# Patient Record
Sex: MALE | Race: WHITE | Employment: OTHER | URBAN - METROPOLITAN AREA
[De-identification: names, ages, dates, MRNs, and addresses within clinical notes are randomized per-mention and may not be internally consistent; named-entity substitution may affect disease eponyms.]

---

## 2017-01-03 ENCOUNTER — HOSPITAL ENCOUNTER (OUTPATIENT)
Dept: PHYSICAL THERAPY | Age: 77
Discharge: HOME | End: 2017-01-03

## 2017-01-03 PROBLEM — R29.3 POSTURAL IMBALANCE: Status: ACTIVE | Noted: 2017-01-03

## 2017-01-03 PROBLEM — R41.3 MEMORY DEFICITS: Status: ACTIVE | Noted: 2017-01-03

## 2017-01-03 PROBLEM — G56.31 MONONEUROPATHY OF RIGHT POSTERIOR INTEROSSEOUS NERVE: Status: ACTIVE | Noted: 2017-01-03

## 2017-01-03 PROBLEM — R25.1 TREMOR: Status: ACTIVE | Noted: 2017-01-03

## 2017-01-03 PROBLEM — G20 PARKINSON DISEASE (HCC): Status: ACTIVE | Noted: 2017-01-03

## 2017-01-03 PROBLEM — G47.52 RBD (REM BEHAVIORAL DISORDER): Status: ACTIVE | Noted: 2017-01-03

## 2017-01-04 NOTE — PROGRESS NOTES
Viviana Lovett  : 1940 [facility]  [address] [facility], [address] [place] [address],[address], [place] [address].  [phone]   [phone]       Patient not seen in clinic today due to wanting to particpate in therapy at a clinic that is closer to his home so deferred evaluation today and the MD will refer to another clinic.

## 2017-01-17 ENCOUNTER — APPOINTMENT (OUTPATIENT)
Dept: CT IMAGING | Age: 77
DRG: 056 | End: 2017-01-17
Attending: EMERGENCY MEDICINE
Payer: MEDICARE

## 2017-01-17 ENCOUNTER — HOSPITAL ENCOUNTER (INPATIENT)
Age: 77
LOS: 10 days | Discharge: NURSING, REHAB OR LONG-TERM CARE | DRG: 056 | End: 2017-01-27
Attending: EMERGENCY MEDICINE | Admitting: HOSPITALIST
Payer: MEDICARE

## 2017-01-17 ENCOUNTER — APPOINTMENT (OUTPATIENT)
Dept: GENERAL RADIOLOGY | Age: 77
DRG: 056 | End: 2017-01-17
Attending: EMERGENCY MEDICINE
Payer: MEDICARE

## 2017-01-17 DIAGNOSIS — N28.9 KIDNEY INSUFFICIENCY: ICD-10-CM

## 2017-01-17 DIAGNOSIS — G93.40 ACUTE ENCEPHALOPATHY: Primary | ICD-10-CM

## 2017-01-17 DIAGNOSIS — R44.3 HALLUCINATION: ICD-10-CM

## 2017-01-17 PROBLEM — N17.9 ACUTE RENAL FAILURE (ARF) (HCC): Status: ACTIVE | Noted: 2017-01-17

## 2017-01-17 LAB
ALBUMIN SERPL BCP-MCNC: 3.9 G/DL (ref 3.2–4.6)
ALBUMIN/GLOB SERPL: 1.2 {RATIO} (ref 1.2–3.5)
ALP SERPL-CCNC: 146 U/L (ref 50–136)
ALT SERPL-CCNC: 9 U/L (ref 12–65)
ANION GAP BLD CALC-SCNC: 9 MMOL/L (ref 7–16)
APPEARANCE UR: CLEAR
AST SERPL W P-5'-P-CCNC: 20 U/L (ref 15–37)
ATRIAL RATE: 62 BPM
BACTERIA URNS QL MICRO: 0 /HPF
BASOPHILS # BLD AUTO: 0 K/UL (ref 0–0.2)
BASOPHILS # BLD: 0 % (ref 0–2)
BILIRUB SERPL-MCNC: 0.5 MG/DL (ref 0.2–1.1)
BILIRUB UR QL: NEGATIVE
BUN SERPL-MCNC: 38 MG/DL (ref 8–23)
CALCIUM SERPL-MCNC: 8.9 MG/DL (ref 8.3–10.4)
CALCULATED P AXIS, ECG09: 49 DEGREES
CALCULATED R AXIS, ECG10: -26 DEGREES
CALCULATED T AXIS, ECG11: -16 DEGREES
CASTS URNS QL MICRO: ABNORMAL /LPF
CHLORIDE SERPL-SCNC: 105 MMOL/L (ref 98–107)
CK SERPL-CCNC: 300 U/L (ref 21–215)
CO2 SERPL-SCNC: 29 MMOL/L (ref 21–32)
COLOR UR: YELLOW
CREAT SERPL-MCNC: 2.62 MG/DL (ref 0.8–1.5)
DIAGNOSIS, 93000: NORMAL
DIASTOLIC BP, ECG02: NORMAL MMHG
DIFFERENTIAL METHOD BLD: ABNORMAL
EOSINOPHIL # BLD: 0.1 K/UL (ref 0–0.8)
EOSINOPHIL NFR BLD: 1 % (ref 0.5–7.8)
EPI CELLS #/AREA URNS HPF: 0 /HPF
ERYTHROCYTE [DISTWIDTH] IN BLOOD BY AUTOMATED COUNT: 14.5 % (ref 11.9–14.6)
FOLATE SERPL-MCNC: 12.1 NG/ML (ref 3.1–17.5)
GLOBULIN SER CALC-MCNC: 3.2 G/DL (ref 2.3–3.5)
GLUCOSE SERPL-MCNC: 173 MG/DL (ref 65–100)
GLUCOSE UR STRIP.AUTO-MCNC: NEGATIVE MG/DL
HCT VFR BLD AUTO: 36.4 % (ref 41.1–50.3)
HGB BLD-MCNC: 12.4 G/DL (ref 13.6–17.2)
HGB UR QL STRIP: ABNORMAL
IMM GRANULOCYTES # BLD: 0 K/UL (ref 0–0.5)
IMM GRANULOCYTES NFR BLD AUTO: 0.4 % (ref 0–5)
KETONES UR QL STRIP.AUTO: NEGATIVE MG/DL
LEUKOCYTE ESTERASE UR QL STRIP.AUTO: NEGATIVE
LYMPHOCYTES # BLD AUTO: 12 % (ref 13–44)
LYMPHOCYTES # BLD: 1.4 K/UL (ref 0.5–4.6)
MCH RBC QN AUTO: 27.7 PG (ref 26.1–32.9)
MCHC RBC AUTO-ENTMCNC: 34.1 G/DL (ref 31.4–35)
MCV RBC AUTO: 81.4 FL (ref 79.6–97.8)
MONOCYTES # BLD: 0.9 K/UL (ref 0.1–1.3)
MONOCYTES NFR BLD AUTO: 8 % (ref 4–12)
NEUTS SEG # BLD: 8.8 K/UL (ref 1.7–8.2)
NEUTS SEG NFR BLD AUTO: 79 % (ref 43–78)
NITRITE UR QL STRIP.AUTO: NEGATIVE
P-R INTERVAL, ECG05: 208 MS
PH UR STRIP: 5 [PH] (ref 5–9)
PLATELET # BLD AUTO: 225 K/UL (ref 150–450)
PMV BLD AUTO: 9.8 FL (ref 10.8–14.1)
POTASSIUM SERPL-SCNC: 3.5 MMOL/L (ref 3.5–5.1)
PROT SERPL-MCNC: 7.1 G/DL (ref 6.3–8.2)
PROT UR STRIP-MCNC: ABNORMAL MG/DL
Q-T INTERVAL, ECG07: 430 MS
QRS DURATION, ECG06: 92 MS
QTC CALCULATION (BEZET), ECG08: 436 MS
RBC # BLD AUTO: 4.47 M/UL (ref 4.23–5.67)
RBC #/AREA URNS HPF: ABNORMAL /HPF
SODIUM SERPL-SCNC: 143 MMOL/L (ref 136–145)
SP GR UR REFRACTOMETRY: 1.01 (ref 1–1.02)
SYSTOLIC BP, ECG01: NORMAL MMHG
TROPONIN I SERPL-MCNC: 0.11 NG/ML (ref 0.02–0.05)
TSH SERPL DL<=0.005 MIU/L-ACNC: 0.53 UIU/ML (ref 0.36–3.74)
UROBILINOGEN UR QL STRIP.AUTO: 0.2 EU/DL (ref 0.2–1)
VENTRICULAR RATE, ECG03: 62 BPM
VIT B12 SERPL-MCNC: 445 PG/ML (ref 193–986)
WBC # BLD AUTO: 11.3 K/UL (ref 4.3–11.1)
WBC URNS QL MICRO: ABNORMAL /HPF

## 2017-01-17 PROCEDURE — 72100 X-RAY EXAM L-S SPINE 2/3 VWS: CPT

## 2017-01-17 PROCEDURE — 81001 URINALYSIS AUTO W/SCOPE: CPT | Performed by: HOSPITALIST

## 2017-01-17 PROCEDURE — 85025 COMPLETE CBC W/AUTO DIFF WBC: CPT | Performed by: EMERGENCY MEDICINE

## 2017-01-17 PROCEDURE — 86592 SYPHILIS TEST NON-TREP QUAL: CPT | Performed by: HOSPITALIST

## 2017-01-17 PROCEDURE — 72220 X-RAY EXAM SACRUM TAILBONE: CPT

## 2017-01-17 PROCEDURE — 74011250636 HC RX REV CODE- 250/636: Performed by: HOSPITALIST

## 2017-01-17 PROCEDURE — 80053 COMPREHEN METABOLIC PANEL: CPT | Performed by: EMERGENCY MEDICINE

## 2017-01-17 PROCEDURE — 82550 ASSAY OF CK (CPK): CPT | Performed by: HOSPITALIST

## 2017-01-17 PROCEDURE — 84443 ASSAY THYROID STIM HORMONE: CPT | Performed by: HOSPITALIST

## 2017-01-17 PROCEDURE — 74011250637 HC RX REV CODE- 250/637: Performed by: HOSPITALIST

## 2017-01-17 PROCEDURE — 99285 EMERGENCY DEPT VISIT HI MDM: CPT | Performed by: EMERGENCY MEDICINE

## 2017-01-17 PROCEDURE — 72125 CT NECK SPINE W/O DYE: CPT

## 2017-01-17 PROCEDURE — 82746 ASSAY OF FOLIC ACID SERUM: CPT | Performed by: HOSPITALIST

## 2017-01-17 PROCEDURE — 84484 ASSAY OF TROPONIN QUANT: CPT | Performed by: HOSPITALIST

## 2017-01-17 PROCEDURE — 74011250636 HC RX REV CODE- 250/636: Performed by: EMERGENCY MEDICINE

## 2017-01-17 PROCEDURE — 65270000029 HC RM PRIVATE

## 2017-01-17 PROCEDURE — 74011250636 HC RX REV CODE- 250/636: Performed by: INTERNAL MEDICINE

## 2017-01-17 PROCEDURE — 82607 VITAMIN B-12: CPT | Performed by: HOSPITALIST

## 2017-01-17 PROCEDURE — 70450 CT HEAD/BRAIN W/O DYE: CPT

## 2017-01-17 PROCEDURE — 71010 XR CHEST PORT: CPT

## 2017-01-17 PROCEDURE — 93005 ELECTROCARDIOGRAM TRACING: CPT | Performed by: EMERGENCY MEDICINE

## 2017-01-17 PROCEDURE — 81003 URINALYSIS AUTO W/O SCOPE: CPT | Performed by: EMERGENCY MEDICINE

## 2017-01-17 PROCEDURE — 96360 HYDRATION IV INFUSION INIT: CPT | Performed by: EMERGENCY MEDICINE

## 2017-01-17 RX ORDER — SODIUM CHLORIDE 0.9 % (FLUSH) 0.9 %
5-10 SYRINGE (ML) INJECTION EVERY 8 HOURS
Status: DISCONTINUED | OUTPATIENT
Start: 2017-01-17 | End: 2017-01-27 | Stop reason: HOSPADM

## 2017-01-17 RX ORDER — ASPIRIN 81 MG/1
81 TABLET ORAL DAILY
Status: DISCONTINUED | OUTPATIENT
Start: 2017-01-18 | End: 2017-01-27 | Stop reason: HOSPADM

## 2017-01-17 RX ORDER — MELATONIN 5 MG
5 CAPSULE ORAL
COMMUNITY
End: 2017-01-27

## 2017-01-17 RX ORDER — HEPARIN SODIUM 5000 [USP'U]/ML
5000 INJECTION, SOLUTION INTRAVENOUS; SUBCUTANEOUS EVERY 8 HOURS
Status: DISCONTINUED | OUTPATIENT
Start: 2017-01-17 | End: 2017-01-27 | Stop reason: HOSPADM

## 2017-01-17 RX ORDER — AMLODIPINE BESYLATE 10 MG/1
10 TABLET ORAL DAILY
Status: DISCONTINUED | OUTPATIENT
Start: 2017-01-18 | End: 2017-01-17

## 2017-01-17 RX ORDER — HYDRALAZINE HYDROCHLORIDE 20 MG/ML
20 INJECTION INTRAMUSCULAR; INTRAVENOUS
Status: DISCONTINUED | OUTPATIENT
Start: 2017-01-17 | End: 2017-01-27 | Stop reason: HOSPADM

## 2017-01-17 RX ORDER — FINASTERIDE 5 MG/1
5 TABLET, FILM COATED ORAL DAILY
Status: DISCONTINUED | OUTPATIENT
Start: 2017-01-18 | End: 2017-01-27 | Stop reason: HOSPADM

## 2017-01-17 RX ORDER — ESCITALOPRAM OXALATE 10 MG/1
10 TABLET ORAL DAILY
Status: DISCONTINUED | OUTPATIENT
Start: 2017-01-18 | End: 2017-01-27 | Stop reason: HOSPADM

## 2017-01-17 RX ORDER — SODIUM CHLORIDE 0.9 % (FLUSH) 0.9 %
5-10 SYRINGE (ML) INJECTION AS NEEDED
Status: DISCONTINUED | OUTPATIENT
Start: 2017-01-17 | End: 2017-01-27 | Stop reason: HOSPADM

## 2017-01-17 RX ORDER — HALOPERIDOL 5 MG/ML
2 INJECTION INTRAMUSCULAR
Status: DISCONTINUED | OUTPATIENT
Start: 2017-01-17 | End: 2017-01-19

## 2017-01-17 RX ORDER — SODIUM CHLORIDE 9 MG/ML
125 INJECTION, SOLUTION INTRAVENOUS CONTINUOUS
Status: DISCONTINUED | OUTPATIENT
Start: 2017-01-17 | End: 2017-01-19

## 2017-01-17 RX ORDER — CIPROFLOXACIN 250 MG/1
250 TABLET, FILM COATED ORAL EVERY 12 HOURS
COMMUNITY
End: 2017-01-27

## 2017-01-17 RX ORDER — AMOXICILLIN AND CLAVULANATE POTASSIUM 875; 125 MG/1; MG/1
1 TABLET, FILM COATED ORAL 2 TIMES DAILY
COMMUNITY
End: 2017-01-27

## 2017-01-17 RX ORDER — FACIAL-BODY WIPES
10 EACH TOPICAL DAILY PRN
Status: DISCONTINUED | OUTPATIENT
Start: 2017-01-17 | End: 2017-01-27 | Stop reason: HOSPADM

## 2017-01-17 RX ORDER — CARBIDOPA AND LEVODOPA 25; 100 MG/1; MG/1
2 TABLET ORAL
Status: DISCONTINUED | OUTPATIENT
Start: 2017-01-17 | End: 2017-01-19

## 2017-01-17 RX ORDER — DIPHENOXYLATE HYDROCHLORIDE AND ATROPINE SULFATE 2.5; .025 MG/1; MG/1
2 TABLET ORAL
Status: DISCONTINUED | OUTPATIENT
Start: 2017-01-17 | End: 2017-01-27 | Stop reason: HOSPADM

## 2017-01-17 RX ORDER — MELATONIN
1000 DAILY
Status: DISCONTINUED | OUTPATIENT
Start: 2017-01-18 | End: 2017-01-27 | Stop reason: HOSPADM

## 2017-01-17 RX ORDER — CARBIDOPA AND LEVODOPA 25; 100 MG/1; MG/1
2 TABLET ORAL 3 TIMES DAILY
Status: DISCONTINUED | OUTPATIENT
Start: 2017-01-17 | End: 2017-01-17

## 2017-01-17 RX ORDER — TEMAZEPAM 15 MG/1
30 CAPSULE ORAL
Status: DISCONTINUED | OUTPATIENT
Start: 2017-01-17 | End: 2017-01-21

## 2017-01-17 RX ORDER — DIPHENHYDRAMINE HYDROCHLORIDE 50 MG/ML
12.5 INJECTION, SOLUTION INTRAMUSCULAR; INTRAVENOUS
Status: DISCONTINUED | OUTPATIENT
Start: 2017-01-17 | End: 2017-01-26

## 2017-01-17 RX ORDER — TAMSULOSIN HYDROCHLORIDE 0.4 MG/1
0.4 CAPSULE ORAL DAILY
Status: DISCONTINUED | OUTPATIENT
Start: 2017-01-18 | End: 2017-01-27 | Stop reason: HOSPADM

## 2017-01-17 RX ORDER — AMLODIPINE BESYLATE 10 MG/1
10 TABLET ORAL DAILY
Status: DISCONTINUED | OUTPATIENT
Start: 2017-01-17 | End: 2017-01-27 | Stop reason: HOSPADM

## 2017-01-17 RX ORDER — ACETAMINOPHEN 325 MG/1
650 TABLET ORAL
Status: DISCONTINUED | OUTPATIENT
Start: 2017-01-17 | End: 2017-01-27 | Stop reason: HOSPADM

## 2017-01-17 RX ORDER — NITROGLYCERIN 0.4 MG/1
0.4 TABLET SUBLINGUAL
Status: DISCONTINUED | OUTPATIENT
Start: 2017-01-17 | End: 2017-01-27 | Stop reason: HOSPADM

## 2017-01-17 RX ORDER — NALOXONE HYDROCHLORIDE 0.4 MG/ML
0.4 INJECTION, SOLUTION INTRAMUSCULAR; INTRAVENOUS; SUBCUTANEOUS AS NEEDED
Status: DISCONTINUED | OUTPATIENT
Start: 2017-01-17 | End: 2017-01-27 | Stop reason: HOSPADM

## 2017-01-17 RX ORDER — CLOPIDOGREL BISULFATE 75 MG/1
75 TABLET ORAL DAILY
Status: DISCONTINUED | OUTPATIENT
Start: 2017-01-18 | End: 2017-01-27 | Stop reason: HOSPADM

## 2017-01-17 RX ORDER — ONDANSETRON 4 MG/1
4 TABLET, ORALLY DISINTEGRATING ORAL
Status: DISCONTINUED | OUTPATIENT
Start: 2017-01-17 | End: 2017-01-27 | Stop reason: HOSPADM

## 2017-01-17 RX ORDER — ATORVASTATIN CALCIUM 40 MG/1
40 TABLET, FILM COATED ORAL DAILY
Status: DISCONTINUED | OUTPATIENT
Start: 2017-01-18 | End: 2017-01-27 | Stop reason: HOSPADM

## 2017-01-17 RX ADMIN — SODIUM CHLORIDE 1000 ML: 900 INJECTION, SOLUTION INTRAVENOUS at 11:52

## 2017-01-17 RX ADMIN — HEPARIN SODIUM 5000 UNITS: 5000 INJECTION, SOLUTION INTRAVENOUS; SUBCUTANEOUS at 17:45

## 2017-01-17 RX ADMIN — Medication 10 ML: at 23:30

## 2017-01-17 RX ADMIN — SODIUM CHLORIDE 125 ML/HR: 900 INJECTION, SOLUTION INTRAVENOUS at 17:45

## 2017-01-17 RX ADMIN — AMLODIPINE BESYLATE 10 MG: 10 TABLET ORAL at 17:45

## 2017-01-17 RX ADMIN — HALOPERIDOL LACTATE 2 MG: 5 INJECTION, SOLUTION INTRAMUSCULAR at 20:15

## 2017-01-17 RX ADMIN — TEMAZEPAM 30 MG: 15 CAPSULE ORAL at 23:17

## 2017-01-17 RX ADMIN — CARBIDOPA AND LEVODOPA 2 TABLET: 25; 100 TABLET ORAL at 17:45
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
## 2017-01-17 NOTE — PROGRESS NOTES
TRANSFER - IN REPORT:    Verbal report received from Jesse RN on Nayely Delgado  being received from [phone] for routine progression of care      Report consisted of patients Situation, Background, Assessment and   Recommendations(SBAR). Information from the following report(s) SBAR, ED Summary, [facility] and Recent Results was reviewed with the receiving nurse. Assessment completed upon patients arrival to unit and care assumed.

## 2017-01-17 NOTE — PROGRESS NOTES
Admission database complete. Prior to admission med list completed from list daughter brought in with patient. List placed on chart. New patient packet given and reviewed with patient/family. Patient/family oriented to room and call system. SBAR handoff given to primary nurse.

## 2017-01-17 NOTE — ED PROVIDER NOTES
HPI Comments: Patient is a 66 yo male who is coming in with more confusion over the last 2 weeks. Patient has had problems with this before and his pcp took him off of his clonazepam to see if this would help, but it has not. Patient also has a history of parkinsons disease. Patient has had a lot of hallucinations and has fallen last time was Sunday before last.   Last night he was found walking outside after midnight barefoot. He was confused at this time. He does stay at home by himself. He is currently on cipro and augmentin for possible urinary infection. He has been complaining of pain in his tailbone and they think he could have fallen last night. Patient is a 67 y.o. male presenting with altered mental status. The history is provided by the patient and a relative. Altered mental status           History reviewed. No pertinent past medical history. Past Surgical History:   Procedure Laterality Date    Hx heent       eye removed; glass eye    Hx orthopaedic       knee replacement    Hx orthopaedic Left      rotator cuff    Pr cardiac surg procedure unlist       stent placement    Hx thyroidectomy           History reviewed. No pertinent family history. Social History     Social History    Marital status:      Spouse name: N/A    Number of children: N/A    Years of education: N/A     Occupational History    Not on file.      Social History Main Topics    Smoking status: Former Smoker    Smokeless tobacco: Not on file    Alcohol use No    Drug use: No    Sexual activity: Not on file     Other Topics Concern    Not on file     Social History Narrative         ALLERGIES: Tramadol; Hydrocodone-acetaminophen; and Sulfa (sulfonamide antibiotics)    Review of Systems   Unable to perform ROS: Mental status change       Vitals:    01/17/17 0949   BP: 95/52   Pulse: 65   Resp: 16   Temp: 98.1 °F (36.7 °C)   SpO2: 95%   Weight: 97.5 kg (215 lb)   Height: 6' 3.5\" (1.918 m) Physical Exam   Constitutional: He appears well-developed and well-nourished. No distress. Cardiovascular: Normal rate and regular rhythm. Murmur heard. Pulmonary/Chest: No respiratory distress. He has no wheezes. He has no rales. Abdominal: Soft. Bowel sounds are normal. He exhibits no distension. There is no tenderness. There is no rebound and no guarding. Neurological: He is alert. He displays normal reflexes. No cranial nerve deficit. He exhibits normal muscle tone. Coordination normal.   Oriented to self. Not oriented place or time. Does not know age he is confused currently. Skin: Skin is warm and dry. He is not diaphoretic. No erythema. Abrasions present on his shins and hands. Psychiatric: He has a normal mood and affect. His behavior is normal.        MDM  Number of Diagnoses or Management Options  Diagnosis management comments: Patient has 2 weeks of delirium or hallucinations and altered mental status workup in ED has been relatively normal except for elevated creatinine of 2.6 baseline seems to be about 1.5-1.6 by starting last years creatinine levels. I am giving IV fluids and admitting to hospitalist for further evaluation. Zane Espinoza MD; 1/17/2017 @1:53 PM Voice dictation software was used during the making of this note. This software is not perfect and grammatical and other typographical errors may be present.   This note has not been proofread for errors.  ===================================================================        Amount and/or Complexity of Data Reviewed  Clinical lab tests: ordered and reviewed (Results for orders placed or performed during the hospital encounter of 01/17/17  -CBC WITH AUTOMATED DIFF       Result                                            Value                         Ref Range                       WBC                                               11.3 (H)                      4.3 - 11.1 K/uL                 RBC 4.47                          4.23 - 5.67 M/uL                HGB                                               12.4 (L)                      13.6 - 17.2 g/dL                HCT                                               36.4 (L)                      41.1 - 50.3 %                   MCV                                               81.4                          79.6 - 97.8 FL                  MCH                                               27.7                          26.1 - 32.9 PG                  MCHC                                              34.1                          31.4 - 35.0 g/dL                RDW                                               14.5                          11.9 - 14.6 %                   PLATELET                                          225                           150 - 450 K/uL                  MPV                                               9.8 (L)                       10.8 - 14.1 FL                  DF                                                AUTOMATED                                                     NEUTROPHILS                                       79 (H)                        43 - 78 %                       LYMPHOCYTES                                       12 (L)                        13 - 44 %                       MONOCYTES                                         8                             4.0 - 12.0 %                    EOSINOPHILS                                       1                             0.5 - 7.8 %                     BASOPHILS                                         0                             0.0 - 2.0 %                     IMMATURE GRANULOCYTES                             0.4                           0.0 - 5.0 %                     ABS. NEUTROPHILS                                  8.8 (H)                       1.7 - 8.2 K/UL                  ABS.  LYMPHOCYTES                                  1.4 0.5 - 4.6 K/UL                  ABS. MONOCYTES                                    0.9                           0.1 - 1.3 K/UL                  ABS. EOSINOPHILS                                  0.1                           0.0 - 0.8 K/UL                  ABS. BASOPHILS                                    0.0                           0.0 - 0.2 K/UL                  ABS. IMM.  GRANS.                                  0.0                           0.0 - 0.5 K/UL             -METABOLIC PANEL, COMPREHENSIVE       Result                                            Value                         Ref Range                       Sodium                                            143                           136 - 145 mmol/L                Potassium                                         3.5                           3.5 - 5.1 mmol/L                Chloride                                          105                           98 - 107 mmol/L                 CO2                                               29                            21 - 32 mmol/L                  Anion gap                                         9                             7 - 16 mmol/L                   Glucose                                           173 (H)                       65 - 100 mg/dL                  BUN                                               38 (H)                        8 - 23 MG/DL                    Creatinine                                        2.62 (H)                      0.8 - 1.5 MG/DL                 GFR est AA                                        31 (L)                        >60 ml/min/1.73m2               GFR est non-AA                                    25 (L)                        >60 ml/min/1.73m2               Calcium                                           8.9                           8.3 - 10.4 MG/DL                Bilirubin, total                                  0.5 0.2 - 1.1 MG/DL                 ALT                                               9 (L)                         12 - 65 U/L                     AST                                               20                            15 - 37 U/L                     Alk. phosphatase                                  146 (H)                       50 - 136 U/L                    Protein, total                                    7.1                           6.3 - 8.2 g/dL                  Albumin                                           3.9                           3.2 - 4.6 g/dL                  Globulin                                          3.2                           2.3 - 3.5 g/dL                  A-G Ratio                                         1.2                           1.2 - 3.5                 )  Tests in the radiology section of CPT®: ordered and reviewed (Xr Spine Lumb 2 Or 3 V    Result Date: 1/17/2017  LUMBAR SPINE AND SACRUM/COCCYX RADIOGRAPHS, 1/17/2017 CLINICAL HISTORY:  Increased confusion. Cuts demonstrates all over. Tailbone pain. FINDINGS: Lumbar spine: Five lumbar type vertebrae are visualized. Mild alignment abnormalities are seen with grade 1 retrolisthesis of L1 on L2, and L2 on L3. No definite acute posterior element abnormality is seen at these levels. Vertebral body height is maintained at all levels. Moderate diffuse discogenic degenerative changes are seen throughout the lumbar spine. Mild apex rightward upper, and apex low for lower curvatures are seen of the lumbar spine. Sacrum: AP and lateral views of the sacrum and coccyx demonstrate no fracture or other abnormality. The sacroiliac joints are nonwidened. No definite acute abnormality is seen of the partially visualized bony pelvis. The pubic symphysis is maintained. IMPRESSION: 1. No acute osseous abnormality of the lumbar spine or sacrum/coccyx evident by plain film imaging.      Xr Sacrum And Coccyx    Result Date: 1/17/2017  LUMBAR SPINE AND SACRUM/COCCYX RADIOGRAPHS, 1/17/2017 CLINICAL HISTORY:  Increased confusion. Cuts demonstrates all over. Tailbone pain. FINDINGS: Lumbar spine: Five lumbar type vertebrae are visualized. Mild alignment abnormalities are seen with grade 1 retrolisthesis of L1 on L2, and L2 on L3. No definite acute posterior element abnormality is seen at these levels. Vertebral body height is maintained at all levels. Moderate diffuse discogenic degenerative changes are seen throughout the lumbar spine. Mild apex rightward upper, and apex low for lower curvatures are seen of the lumbar spine. Sacrum: AP and lateral views of the sacrum and coccyx demonstrate no fracture or other abnormality. The sacroiliac joints are nonwidened. No definite acute abnormality is seen of the partially visualized bony pelvis. The pubic symphysis is maintained. IMPRESSION: 1. No acute osseous abnormality of the lumbar spine or sacrum/coccyx evident by plain film imaging. Ct Head Wo Cont    Result Date: 1/17/2017  CT HEAD, AND CERVICAL SPINE WITHOUT CONTRAST, 1/17/2017. History: Cuts and scrapes all over. Comparison: None. Technique:   5 mm axial scans from the skull base to the vertex, and 1.25 mm axial scans from the skull base into the upper chest performed, and sagittal and coronal reconstructed images performed. All CT scans performed at this facility use one or all of the following: Automated exposure control, adjustment of the mA and/or kVp according to patient's size, iterative reconstruction. CT HEAD: Findings:  No evidence of intracranial hemorrhage is seen. No abnormal extra-axial fluid collections are seen. The ventricles are normal in size and configuration. Moderate cortical involutional changes are seen which are not felt to be abnormal given the patient's age. No evidence of midline shift or obvious mass effect is seen.   No abnormal edema pattern is seen in a vascular distribution to suggest large artery infarction. Evaluation with bone windows shows no acute osseous abnormality of the bony calvarium. Postsurgical changes are seen of the right globe. No abnormal fluid collections are seen associated with the aerated sinuses. CT CERVICAL SPINE: The skull base is unremarkable although not well evaluated due to bone reconstruction algorithm. Vertebral body height is maintained at all levels. No evidence of acute fracture is seen. Reconstructed images show maintained alignment. The dens is intact, and the pre dens space is normal. Moderate discogenic degenerative changes are seen throughout the mid and lower cervical spine. Advanced facet hypertrophic changes are seen diffusely throughout the bilateral cervical spine. Prevertebral soft tissues are normal. Limited evaluation of the lung apices shows no gross abnormalities. IMPRESSION: 1. No acute intracranial process evident by noncontrast CT study of the head. 2. No acute osseous abnormality the bony calvarium, skull base, or cervical spine. Ct Spine Cerv Wo Cont    Result Date: 1/17/2017  CT HEAD, AND CERVICAL SPINE WITHOUT CONTRAST, 1/17/2017. History: Cuts and scrapes all over. Comparison: None. Technique:   5 mm axial scans from the skull base to the vertex, and 1.25 mm axial scans from the skull base into the upper chest performed, and sagittal and coronal reconstructed images performed. All CT scans performed at this facility use one or all of the following: Automated exposure control, adjustment of the mA and/or kVp according to patient's size, iterative reconstruction. CT HEAD: Findings:  No evidence of intracranial hemorrhage is seen. No abnormal extra-axial fluid collections are seen. The ventricles are normal in size and configuration. Moderate cortical involutional changes are seen which are not felt to be abnormal given the patient's age. No evidence of midline shift or obvious mass effect is seen.   No abnormal edema pattern is seen in a vascular distribution to suggest large artery infarction. Evaluation with bone windows shows no acute osseous abnormality of the bony calvarium. Postsurgical changes are seen of the right globe. No abnormal fluid collections are seen associated with the aerated sinuses. CT CERVICAL SPINE: The skull base is unremarkable although not well evaluated due to bone reconstruction algorithm. Vertebral body height is maintained at all levels. No evidence of acute fracture is seen. Reconstructed images show maintained alignment. The dens is intact, and the pre dens space is normal. Moderate discogenic degenerative changes are seen throughout the mid and lower cervical spine. Advanced facet hypertrophic changes are seen diffusely throughout the bilateral cervical spine. Prevertebral soft tissues are normal. Limited evaluation of the lung apices shows no gross abnormalities. IMPRESSION: 1. No acute intracranial process evident by noncontrast CT study of the head. 2. No acute osseous abnormality the bony calvarium, skull base, or cervical spine. Xr Chest Port    Result Date: 1/17/2017  CHEST X-RAY, single portable view  1/17/2017 History: Increased confusion Technique: Single frontal view of the chest. Comparison: None. Findings: The cardiac silhouette is mild to moderately enlarged. The lungs are expanded without evidence for pneumothorax. No consolidative airspace process or pleural effusion is seen. IMPRESSION: 1.   Mild to moderate cardiomegaly.    )    Risk of Complications, Morbidity, and/or Mortality  Presenting problems: high  Diagnostic procedures: high  Management options: high      ED Course       Procedures

## 2017-01-17 NOTE — PROGRESS NOTES
Mr. Latrell Klein family concerned that he has not taken his daily Lisinopril and is not on an antibiotic for his PTA urinary tract infection. Also concerned that his left hand laceration may need stitches and that his tongue is black from biting it when he fell at home. Critical troponin level of 0.11 called at 1625. Spoke with Dr. Barbie Lopez regarding all of the above. States to obtain urine specimen; place steristrips to hand. No other new orders received. Will monitor closely with family at bedside.

## 2017-01-17 NOTE — ED TRIAGE NOTES
Patient's family states patient has been increasingly confused over the past few weeks. [facility] patient was up last night and now has cuts and scrapes all over him and is complaining of pain in his tail bone. [facility] PCP took him off of klonopin to see if it would help confusion as this is and ongoing problem.

## 2017-01-17 NOTE — ED NOTES
TRANSFER - OUT REPORT:    Verbal report given to Sri BROWN(name) on Gilford Eans  being transferred to [facility](unit) for routine progression of care       Report consisted of patients Situation, Background, Assessment and   Recommendations(SBAR). Information from the following report(s) SBAR was reviewed with the receiving nurse. Lines:   Peripheral IV 01/17/17 Left Antecubital (Active)   Site Assessment Clean, dry, & intact 1/17/2017  2:24 PM   Phlebitis Assessment 0 1/17/2017  2:24 PM   Infiltration Assessment 0 1/17/2017  2:24 PM        Opportunity for questions and clarification was provided.       Patient transported with:   [place]

## 2017-01-17 NOTE — PROGRESS NOTES
MR. Mer Cole resting in bed with family at bedside. Alert, agitated at this time. Oriented to person and place only. States he does not want to be bothered. Refusing complete skin assessment at this time. Partial skin assessment completed with Sandi Moody RN. Multiple skin abrasions and tears to arms and legs. Right elbow with skin tear; bandage in place. Left palm with laceration and bandage in place. Right foot with bandage in place from ED. Will address when wound care nurse assesses. Wound consulted. Sacrum is reddened but blanchable and without skin breakdown. On room air with clear lung sounds bilaterally. Suzie alarm placed for safety. Family updated on today's treatment plan and given security code to call for patient status. Snacks and drink provided. Will continue to monitor closely.

## 2017-01-18 ENCOUNTER — APPOINTMENT (OUTPATIENT)
Dept: MRI IMAGING | Age: 77
DRG: 056 | End: 2017-01-18
Attending: HOSPITALIST
Payer: MEDICARE

## 2017-01-18 LAB
ANION GAP BLD CALC-SCNC: 12 MMOL/L (ref 7–16)
BUN SERPL-MCNC: 28 MG/DL (ref 8–23)
CALCIUM SERPL-MCNC: 8.8 MG/DL (ref 8.3–10.4)
CHLORIDE SERPL-SCNC: 108 MMOL/L (ref 98–107)
CO2 SERPL-SCNC: 25 MMOL/L (ref 21–32)
CREAT SERPL-MCNC: 2.02 MG/DL (ref 0.8–1.5)
ERYTHROCYTE [DISTWIDTH] IN BLOOD BY AUTOMATED COUNT: 14.5 % (ref 11.9–14.6)
GLUCOSE SERPL-MCNC: 152 MG/DL (ref 65–100)
HCT VFR BLD AUTO: 34.5 % (ref 41.1–50.3)
HGB BLD-MCNC: 11.8 G/DL (ref 13.6–17.2)
MCH RBC QN AUTO: 27.8 PG (ref 26.1–32.9)
MCHC RBC AUTO-ENTMCNC: 34.2 G/DL (ref 31.4–35)
MCV RBC AUTO: 81.2 FL (ref 79.6–97.8)
PLATELET # BLD AUTO: 215 K/UL (ref 150–450)
PMV BLD AUTO: 10 FL (ref 10.8–14.1)
POTASSIUM SERPL-SCNC: 3.2 MMOL/L (ref 3.5–5.1)
RBC # BLD AUTO: 4.25 M/UL (ref 4.23–5.67)
RPR SER QL: NONREACTIVE
SODIUM SERPL-SCNC: 145 MMOL/L (ref 136–145)
TROPONIN I SERPL-MCNC: 0.12 NG/ML (ref 0.02–0.05)
WBC # BLD AUTO: 10.5 K/UL (ref 4.3–11.1)

## 2017-01-18 PROCEDURE — 80048 BASIC METABOLIC PNL TOTAL CA: CPT | Performed by: HOSPITALIST

## 2017-01-18 PROCEDURE — 70551 MRI BRAIN STEM W/O DYE: CPT

## 2017-01-18 PROCEDURE — 74011250636 HC RX REV CODE- 250/636: Performed by: INTERNAL MEDICINE

## 2017-01-18 PROCEDURE — 74011000302 HC RX REV CODE- 302: Performed by: HOSPITALIST

## 2017-01-18 PROCEDURE — 74011250637 HC RX REV CODE- 250/637: Performed by: HOSPITALIST

## 2017-01-18 PROCEDURE — 85027 COMPLETE CBC AUTOMATED: CPT | Performed by: HOSPITALIST

## 2017-01-18 PROCEDURE — 36415 COLL VENOUS BLD VENIPUNCTURE: CPT | Performed by: HOSPITALIST

## 2017-01-18 PROCEDURE — 74011250636 HC RX REV CODE- 250/636: Performed by: HOSPITALIST

## 2017-01-18 PROCEDURE — 77030011256 HC DRSG MEPILEX <16IN NO BORD MOLN -A

## 2017-01-18 PROCEDURE — 86580 TB INTRADERMAL TEST: CPT | Performed by: HOSPITALIST

## 2017-01-18 PROCEDURE — 65270000029 HC RM PRIVATE

## 2017-01-18 PROCEDURE — 84484 ASSAY OF TROPONIN QUANT: CPT | Performed by: HOSPITALIST

## 2017-01-18 RX ORDER — LABETALOL 200 MG/1
200 TABLET, FILM COATED ORAL EVERY 12 HOURS
Status: DISCONTINUED | OUTPATIENT
Start: 2017-01-18 | End: 2017-01-27 | Stop reason: HOSPADM

## 2017-01-18 RX ORDER — AMOXICILLIN AND CLAVULANATE POTASSIUM 875; 125 MG/1; MG/1
1 TABLET, FILM COATED ORAL EVERY 12 HOURS
Status: DISCONTINUED | OUTPATIENT
Start: 2017-01-18 | End: 2017-01-27 | Stop reason: HOSPADM

## 2017-01-18 RX ADMIN — VITAMIN D, TAB 1000IU (100/BT) 1000 UNITS: 25 TAB at 10:03

## 2017-01-18 RX ADMIN — Medication 5 ML: at 14:00

## 2017-01-18 RX ADMIN — FINASTERIDE 5 MG: 5 TABLET, FILM COATED ORAL at 10:04

## 2017-01-18 RX ADMIN — CARBIDOPA AND LEVODOPA 2 TABLET: 25; 100 TABLET ORAL at 17:55

## 2017-01-18 RX ADMIN — DIPHENHYDRAMINE HYDROCHLORIDE 12.5 MG: 50 INJECTION, SOLUTION INTRAMUSCULAR; INTRAVENOUS at 01:31

## 2017-01-18 RX ADMIN — LABETALOL HCL 200 MG: 200 TABLET, FILM COATED ORAL at 17:55

## 2017-01-18 RX ADMIN — AMOXICILLIN AND CLAVULANATE POTASSIUM 1 TABLET: 875; 125 TABLET, FILM COATED ORAL at 22:27

## 2017-01-18 RX ADMIN — ESCITALOPRAM OXALATE 10 MG: 10 TABLET ORAL at 10:04

## 2017-01-18 RX ADMIN — HEPARIN SODIUM 5000 UNITS: 5000 INJECTION, SOLUTION INTRAVENOUS; SUBCUTANEOUS at 11:40

## 2017-01-18 RX ADMIN — AMLODIPINE BESYLATE 10 MG: 10 TABLET ORAL at 10:05

## 2017-01-18 RX ADMIN — TUBERCULIN PURIFIED PROTEIN DERIVATIVE 5 UNITS: 5 INJECTION INTRADERMAL at 17:56

## 2017-01-18 RX ADMIN — Medication 10 ML: at 06:07

## 2017-01-18 RX ADMIN — TAMSULOSIN HYDROCHLORIDE 0.4 MG: 0.4 CAPSULE ORAL at 10:04

## 2017-01-18 RX ADMIN — HALOPERIDOL LACTATE 2 MG: 5 INJECTION, SOLUTION INTRAMUSCULAR at 04:42

## 2017-01-18 RX ADMIN — CLOPIDOGREL BISULFATE 75 MG: 75 TABLET ORAL at 10:03

## 2017-01-18 RX ADMIN — HEPARIN SODIUM 5000 UNITS: 5000 INJECTION, SOLUTION INTRAVENOUS; SUBCUTANEOUS at 17:55

## 2017-01-18 RX ADMIN — HEPARIN SODIUM 5000 UNITS: 5000 INJECTION, SOLUTION INTRAVENOUS; SUBCUTANEOUS at 00:36

## 2017-01-18 RX ADMIN — SODIUM CHLORIDE 125 ML/HR: 900 INJECTION, SOLUTION INTRAVENOUS at 11:48

## 2017-01-18 RX ADMIN — CARBIDOPA AND LEVODOPA 2 TABLET: 25; 100 TABLET ORAL at 11:55

## 2017-01-18 RX ADMIN — ASPIRIN 81 MG: 81 TABLET, COATED ORAL at 10:01

## 2017-01-18 RX ADMIN — LEVOTHYROXINE SODIUM 137 MCG: 112 TABLET ORAL at 04:48

## 2017-01-18 RX ADMIN — CARBIDOPA AND LEVODOPA 2 TABLET: 25; 100 TABLET ORAL at 04:49

## 2017-01-18 RX ADMIN — ATORVASTATIN CALCIUM 40 MG: 40 TABLET, FILM COATED ORAL at 10:02

## 2017-01-18 NOTE — PROGRESS NOTES
Bedside report given to Eloy Lyon RN. Upon arrival to room family is standing over patient bed crying. Daughter states he is getting very agitated and more confused. States \"I think you are going to have to give him something to settle him down. He is getting worse and trying to get out of bed. \" Remains on room air with IVFs infusing in left arm. Posey alarm is in place for safety. Unable to obtain informed consent for MRI this shift. Tiny Richards, verbalized understanding.

## 2017-01-18 NOTE — PROGRESS NOTES
Hospitalist Progress Note    2017  Admit Date: 2017 10:51 AM   NAME: Prince Padilla   :  1940   MRN:  [id number]   Attending: Shailesh Muhammad MD  PCP:  Kanwal Freire MD      Admitted for:confusion, acute renal failure,     SUBJECTIVE:   Patient this morning still confused about daughter. No fever chills overnight. Was up all night. Has no complaints of pain nausea no vomiting. Having regular bowel movements          Review of Systems negative with exception of pertinent positives noted above  Past medical history unchanged from H&P    PHYSICAL EXAM     Visit Vitals    BP (!) 160/95    Pulse 73    Temp 97.2 °F (36.2 °C)    Resp 18    Ht 6' 3.5\" (1.918 m)    Wt 96.2 kg (212 lb)    SpO2 96%    BMI 26.15 kg/m2      Temp (24hrs), Av.3 °F (36.8 °C), Min:97.2 °F (36.2 °C), Max:99.5 °F (37.5 °C)    Oxygen Therapy  O2 Sat (%): 96 % (17 0821)  Pulse via Oximetry: 87 beats per minute (17 0022)  O2 Device: Room air (17 0022)    Intake/Output Summary (Last 24 hours) at 17 1527  Last data filed at 17 0300   Gross per 24 hour   Intake              240 ml   Output              900 ml   Net             -660 ml        General: No acute distress  mucous membranes pink and moist acyanotic anicteric  Neck:  Supple full range of motion  Lungs:  Air entry equal bilaterally, no crepitations rales rhonchi   Heart:  Regular rate and rhythm,  No murmur, rub, or gallop  Abdomen: Soft, Non distended, Non tender, no rebound guarding Positive bowel sounds  Extremities: No cyanosis, clubbing or edema  Neurologic:  Confused, no focal deficits. No shortness of breath  Musculoskeletal: no   Joint swelling tenderness erythema  Skin:  No erythema, rashes noted          LAB  No results for input(s): GLUCPOC in the last 72 hours.     No lab exists for component: [address]      17   0546   WBC  10.5   HGB  11.8*   HCT  34.5*   PLT  215     Recent Labs      17   05 01/18/17   0035  01/17/17   0950   NA  145   --   143   K  3.2*   --   3.5   CL  108*   --   105   CO2  25   --   29   GLU  152*   --   173*   BUN  28*   --   38*   CREA  2.02*   --   2.62*   CA  8.8   --   8.9   TROIQ   --   0.12*  0.11*   ALB   --    --   3.9   TBILI   --    --   0.5   ALT   --    --   9*   SGOT   --    --   20       EKG and imaging reviewed personally by me  No results found. Results for orders placed or performed during the hospital encounter of 01/17/17   EKG, 12 LEAD, INITIAL   Result Value Ref Range    Systolic BP  mmHg    Diastolic BP  mmHg    Ventricular Rate 62 BPM    Atrial Rate 62 BPM    P-R Interval 208 ms    QRS Duration 92 ms    Q-T Interval 430 ms    QTC Calculation (Bezet) 436 ms    Calculated P Axis 49 degrees    Calculated R Axis -26 degrees    Calculated T Axis -16 degrees    Diagnosis       !! AGE AND GENDER SPECIFIC ECG ANALYSIS !! Normal sinus rhythm  Moderate voltage criteria for LVH, may be normal variant  Borderline ECG  Confirmed by [facility] NOEMI POLLACK (), NADEEN MCCLAIN ([id number]) on 1/17/2017 1:53:13 PM       XR Results (most recent):    Results from [place] encounter on 01/17/17   XR SACRUM AND COCCYX   Narrative LUMBAR SPINE AND SACRUM/COCCYX RADIOGRAPHS, 1/17/2017    CLINICAL HISTORY:  Increased confusion. Cuts demonstrates all over. Tailbone  pain. FINDINGS:    Lumbar spine:   Five lumbar type vertebrae are visualized. Mild alignment abnormalities are  seen with grade 1 retrolisthesis of L1 on L2, and L2 on L3. No definite acute  posterior element abnormality is seen at these levels. Vertebral body height is  maintained at all levels. Moderate diffuse discogenic degenerative changes are  seen throughout the lumbar spine. Mild apex rightward upper, and apex low for  lower curvatures are seen of the lumbar spine. Sacrum:   AP and lateral views of the sacrum and coccyx demonstrate no fracture or other  abnormality. The sacroiliac joints are nonwidened.  No definite acute abnormality  is seen of the partially visualized bony pelvis. The pubic symphysis is  maintained. Impression IMPRESSION:   1. No acute osseous abnormality of the lumbar spine or sacrum/coccyx evident by  plain film imaging. 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 Repeat UA is clear with no evidence of infection.  This is likely treated  · Uncontrolled HTN - his ACE is being held due to ARF will continue to follow    DVT Prophylaxis: heparin  Dispo - home vs STR    Signed By: Brett Romano MD     January 18, 2017

## 2017-01-18 NOTE — PROGRESS NOTES
Spoke with Dr. Clary Ford about pt being confused and trying to get out of bed. Family voices concerns for pt. Safety as well. Clary Ford to review pt chart.

## 2017-01-18 NOTE — PROGRESS NOTES
Met with patient's family regarding discharge planning. Patient has been living at his own home alone, but has had increased confusion and falls resulting in family moving him into their home so that they could care for him. Family requested [facility] paperwork be filled out for in-home assistance when they are not able to be home with the patient. Paperwork completed and signed by attending MD. Licha Arabi that most of the issues that we are currently treating are acute. Therefore, the [address] may require further evaluation at one of their facilities. They voiced understanding. Explained that we have requested PT and OT examinations for recommendations for discharge planning. They voiced approval of this plan as well. Case Management will continue to follow and make discharge recommendations as appropriate.     Care Management Interventions  Transition of Care Consult (CM Consult): Discharge Planning  Discharge Durable Medical Equipment: No  Physical Therapy Consult: Yes  Occupational Therapy Consult: Yes  Speech Therapy Consult: No  Current Support Network: Relative's Home  Confirm Follow Up Transport: Family  Plan discussed with Pt/Family/Caregiver: Yes  Freedom of Choice Offered: Yes  Discharge Location  Discharge Placement: Other: (To be determined.)

## 2017-01-18 NOTE — PROGRESS NOTES
Patient was confused and restless during majority of night. Only resting for very short periods. Patient currently resting quietly with family member at bedside. Respirations even and unlabored.

## 2017-01-18 NOTE — PROGRESS NOTES
Shift assessment completed. Patient alert and oriented to self, but disoriented to time, place and situation. Patient appears anxious, family in room trying to keep pt. Calm. Bruises and abrasions to all extremities. NS infusing at 125 cc/hr. Respirations even and unlabored. No acute distress noted. Bed low, locked, call bell within reach. Side rails up x 3. Posey alarm on.

## 2017-01-18 NOTE — PROGRESS NOTES
Tried to place patient on Cpap, patient is confused and refused to wear.  Talked to patient nurse and asked her to try to convince patient to wear
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
## 2017-01-18 NOTE — PROGRESS NOTES
Spoke with Dr. Argelia Pearce regarding pt. Continual agitation and attempts to get out of bed ewth posey alarm use and 2 mg of haldol IM. Bilateral soft wrist restraints ordered.

## 2017-01-19 LAB
ALBUMIN SERPL BCP-MCNC: 3.4 G/DL (ref 3.2–4.6)
ALBUMIN/GLOB SERPL: 1.2 {RATIO} (ref 1.2–3.5)
ALP SERPL-CCNC: 118 U/L (ref 50–136)
ALT SERPL-CCNC: 13 U/L (ref 12–65)
ANION GAP BLD CALC-SCNC: 7 MMOL/L (ref 7–16)
AST SERPL W P-5'-P-CCNC: 21 U/L (ref 15–37)
BILIRUB DIRECT SERPL-MCNC: 0.1 MG/DL
BILIRUB SERPL-MCNC: 0.7 MG/DL (ref 0.2–1.1)
BUN SERPL-MCNC: 22 MG/DL (ref 8–23)
CALCIUM SERPL-MCNC: 8.6 MG/DL (ref 8.3–10.4)
CHLORIDE SERPL-SCNC: 110 MMOL/L (ref 98–107)
CO2 SERPL-SCNC: 28 MMOL/L (ref 21–32)
CREAT SERPL-MCNC: 1.79 MG/DL (ref 0.8–1.5)
ERYTHROCYTE [DISTWIDTH] IN BLOOD BY AUTOMATED COUNT: 14.4 % (ref 11.9–14.6)
GLOBULIN SER CALC-MCNC: 2.8 G/DL (ref 2.3–3.5)
GLUCOSE SERPL-MCNC: 137 MG/DL (ref 65–100)
HCT VFR BLD AUTO: 33.3 % (ref 41.1–50.3)
HGB BLD-MCNC: 11.3 G/DL (ref 13.6–17.2)
MCH RBC QN AUTO: 27.5 PG (ref 26.1–32.9)
MCHC RBC AUTO-ENTMCNC: 33.9 G/DL (ref 31.4–35)
MCV RBC AUTO: 81 FL (ref 79.6–97.8)
PLATELET # BLD AUTO: 207 K/UL (ref 150–450)
PMV BLD AUTO: 9.9 FL (ref 10.8–14.1)
POTASSIUM SERPL-SCNC: 3.1 MMOL/L (ref 3.5–5.1)
PROT SERPL-MCNC: 6.2 G/DL (ref 6.3–8.2)
RBC # BLD AUTO: 4.11 M/UL (ref 4.23–5.67)
SODIUM SERPL-SCNC: 145 MMOL/L (ref 136–145)
WBC # BLD AUTO: 9.9 K/UL (ref 4.3–11.1)

## 2017-01-19 PROCEDURE — 80048 BASIC METABOLIC PNL TOTAL CA: CPT | Performed by: HOSPITALIST

## 2017-01-19 PROCEDURE — 74011250637 HC RX REV CODE- 250/637: Performed by: HOSPITALIST

## 2017-01-19 PROCEDURE — 85027 COMPLETE CBC AUTOMATED: CPT | Performed by: HOSPITALIST

## 2017-01-19 PROCEDURE — 74011000250 HC RX REV CODE- 250: Performed by: HOSPITALIST

## 2017-01-19 PROCEDURE — 36415 COLL VENOUS BLD VENIPUNCTURE: CPT | Performed by: HOSPITALIST

## 2017-01-19 PROCEDURE — C8929 TTE W OR WO FOL WCON,DOPPLER: HCPCS

## 2017-01-19 PROCEDURE — 74011250636 HC RX REV CODE- 250/636: Performed by: INTERNAL MEDICINE

## 2017-01-19 PROCEDURE — 97162 PT EVAL MOD COMPLEX 30 MIN: CPT

## 2017-01-19 PROCEDURE — 97166 OT EVAL MOD COMPLEX 45 MIN: CPT

## 2017-01-19 PROCEDURE — 65270000029 HC RM PRIVATE

## 2017-01-19 PROCEDURE — 74011250636 HC RX REV CODE- 250/636: Performed by: HOSPITALIST

## 2017-01-19 PROCEDURE — 80076 HEPATIC FUNCTION PANEL: CPT | Performed by: HOSPITALIST

## 2017-01-19 RX ADMIN — VITAMIN D, TAB 1000IU (100/BT) 1000 UNITS: 25 TAB at 10:15

## 2017-01-19 RX ADMIN — HEPARIN SODIUM 5000 UNITS: 5000 INJECTION, SOLUTION INTRAVENOUS; SUBCUTANEOUS at 18:01

## 2017-01-19 RX ADMIN — PERFLUTREN 1 ML: 6.52 INJECTION, SUSPENSION INTRAVENOUS at 14:00

## 2017-01-19 RX ADMIN — AMLODIPINE BESYLATE 10 MG: 10 TABLET ORAL at 10:15

## 2017-01-19 RX ADMIN — CARBIDOPA AND LEVODOPA 2 TABLET: 25; 100 TABLET ORAL at 12:29

## 2017-01-19 RX ADMIN — ASPIRIN 81 MG: 81 TABLET, COATED ORAL at 10:15

## 2017-01-19 RX ADMIN — Medication 5 ML: at 23:51

## 2017-01-19 RX ADMIN — LABETALOL HCL 200 MG: 200 TABLET, FILM COATED ORAL at 23:50

## 2017-01-19 RX ADMIN — TEMAZEPAM 30 MG: 15 CAPSULE ORAL at 04:26

## 2017-01-19 RX ADMIN — LEVOTHYROXINE SODIUM 137 MCG: 112 TABLET ORAL at 06:11

## 2017-01-19 RX ADMIN — CARBIDOPA AND LEVODOPA 2 TABLET: 25; 100 TABLET ORAL at 06:11

## 2017-01-19 RX ADMIN — Medication 10 ML: at 18:01

## 2017-01-19 RX ADMIN — AMOXICILLIN AND CLAVULANATE POTASSIUM 1 TABLET: 875; 125 TABLET, FILM COATED ORAL at 23:50

## 2017-01-19 RX ADMIN — LABETALOL HCL 200 MG: 200 TABLET, FILM COATED ORAL at 10:15

## 2017-01-19 RX ADMIN — AMOXICILLIN AND CLAVULANATE POTASSIUM 1 TABLET: 875; 125 TABLET, FILM COATED ORAL at 10:15

## 2017-01-19 RX ADMIN — CLOPIDOGREL BISULFATE 75 MG: 75 TABLET ORAL at 10:15

## 2017-01-19 RX ADMIN — HEPARIN SODIUM 5000 UNITS: 5000 INJECTION, SOLUTION INTRAVENOUS; SUBCUTANEOUS at 10:16

## 2017-01-19 RX ADMIN — HALOPERIDOL LACTATE 2 MG: 5 INJECTION, SOLUTION INTRAMUSCULAR at 12:37

## 2017-01-19 RX ADMIN — ESCITALOPRAM OXALATE 10 MG: 10 TABLET ORAL at 10:15

## 2017-01-19 RX ADMIN — HEPARIN SODIUM 5000 UNITS: 5000 INJECTION, SOLUTION INTRAVENOUS; SUBCUTANEOUS at 01:00

## 2017-01-19 RX ADMIN — TAMSULOSIN HYDROCHLORIDE 0.4 MG: 0.4 CAPSULE ORAL at 10:15

## 2017-01-19 RX ADMIN — Medication 5 ML: at 06:12

## 2017-01-19 RX ADMIN — FINASTERIDE 5 MG: 5 TABLET, FILM COATED ORAL at 10:15

## 2017-01-19 RX ADMIN — ATORVASTATIN CALCIUM 40 MG: 40 TABLET, FILM COATED ORAL at 10:15

## 2017-01-19 RX ADMIN — CARBIDOPA AND LEVODOPA 2 TABLET: 25; 100 TABLET ORAL at 18:01

## 2017-01-19 NOTE — PROGRESS NOTES
Resting, no distress noted. Denies needs or c/o. Respirations are even and unlabored. Denies pain, SOB, or N/V. Call light is within reach. Will continue to monitor.

## 2017-01-19 NOTE — PROGRESS NOTES
Pt resting quietly in bed, HOB elevated. PT A&Ox4 on RA. Lung sounds diminished, S1/S2 audible, peripheral pulses palpable, extremities warm. Pt has IV to left AC, dried blood at insertion site but otherwise clean, dry, and intact. Pt denies pain or SOB at this time. Call light in place, pt instructed to call for assistance as needed.

## 2017-01-19 NOTE — PROGRESS NOTES
AM assessment complete. This patient denies pain, SOB, or N/V. Call light is within reach. Encouraged patient to call with needs.  Will continue to monitor
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
## 2017-01-19 NOTE — PROGRESS NOTES
Problem: Self Care Deficits Care Plan (Adult)  Goal: *Acute Goals and Plan of Care (Insert Text)  1. Woo Sun will follow simple one step commands 90% of session to improve independence with ADL. Seannensdinesh Nicki Fatima will complete toileting/toilet transfer with minimal assistance to increase independence with self-care. [address] Verena Richardson will complete functional mobility for household distances with CGA and minimal cues for safety. [address] Verena Richardson will complete 25 minutes of ADL with minimal cues throughout to demonstrate improved cognition and activity tolerance for daily tasks. Timeframe: 7 vists      OCCUPATIONAL THERAPY: Initial Assessment and PM 1/19/2017  INPATIENT: Hospital Day: 3  Payor: CARE IMPROVEMENT PLUS / [place] / Product Type: [employer] Medicare /      NAME/AGE/GENDER: Woo Sun is a 67 y.o. male      PRIMARY DIAGNOSIS:  Acute renal failure (ARF) ([facility] [address] Acute renal failure (ARF) ([facility] [address] Acute renal failure (ARF) ([facility])        ICD-10: Treatment Diagnosis:        · Generalized Muscle Weakness (M62.81)  · Other lack of cordination (R27.8)  · Repeated Falls (R29.6)  · Abnormal posture (R29.3)   Precautions/Allergies:         Tramadol; Hydrocodone-acetaminophen; and Sulfa (sulfonamide antibiotics)       ASSESSMENT:      Mr. Nanda Fatima presents to the hospital with recent onset on increased confusion, falls, and hallucinations per family. Pt has hx of Parkinson's Disease and has been recently accessed by neurologist with modifications of medications per family(Dr. Calixto Lucas). Pt has been extremely restless and not sleeping since admission. Pt was confused and disoriented upon arrival but overall was cooperative. Pt completed functional mobility in the room with minimal assistance x 2 for safety with pt noted to have decreased safety awareness and decreased command following for management of walker.  Pt has multiple scrapes on his body from falls and has been wandering outside the home. Pt was independent per family until 1-2 weeks ago. Pt is at risk for future falls and is limited with ADL tasks/functional transfers. Pt will benefit from OT services to address stated goals and plan of care. This section established at most recent assessment   PROBLEM LIST (Impairments causing functional limitations):  1. Decreased Strength  2. Decreased ADL/Functional Activities  3. Decreased Transfer Abilities  4. Decreased Ambulation Ability/Technique  5. Decreased Balance  6. Decreased Activity Tolerance  7. Decreased Flexibility/Joint Mobility  8. Decreased Skin Integrity/Hygeine  9. Decreased [place] with Home Exercise Program  10. Decreased Cognition    INTERVENTIONS PLANNED: (Benefits and precautions of occupational therapy have been discussed with the patient.)  1. Activities of daily living training  2. Adaptive equipment training  3. Balance training  4. Clothing management  5. Cognitive training  6. Donning&doffing training  7. Group therapy  8. Neuromuscular re-eduation  9. Theraputic activity  10. Theraputic exercise      TREATMENT PLAN: Frequency/Duration: Follow patient 3 times per week to address above goals. Rehabilitation Potential For Stated Goals: GOOD      RECOMMENDED REHABILITATION/EQUIPMENT: (at time of discharge pending progress): Continue Skilled Therapy. OCCUPATIONAL PROFILE AND HISTORY:   History of Present Injury/Illness (Reason for Referral):  See H&P  Past Medical History/Comorbidities: hx of Parkinson's Disease   Mr. Teodoro Hernandez  has no past medical history on file. Mr. Teodoro Hernandez  has a past surgical history that includes heent; orthopaedic; orthopaedic (Left); cardiac surg procedure unlist; and thyroidectomy.   Social History/Living Environment:   Home Environment: Private residence  One/Two Story Residence: Split level  Living Alone: Yes (staying with daughter recently)  Support Systems: Child(margie)  Patient Expects to be Discharged to[de-identified] Unknown  Current DME Used/Available at Home: None  Prior Level of Function/Work/Activity:  Pt was living alone and fairly independent with ADL/functional mobility up to a few weeks ago. Has been staying with daughter lately due to increased confusion. Personal Factors:          Social Background:  Living alone; frequent falls        Overall Behavior:  Confusion; hallucinations; decreased command following/cognition   Number of Personal Factors/Comorbidities that affect the Plan of Care: Extensive review of physical, cognitive, and psychosocial performance (3+):  HIGH COMPLEXITY   ASSESSMENT OF OCCUPATIONAL PERFORMANCE[de-identified]   Activities of Daily Living:           Basic ADLs (From Assessment) Complex ADLs (From Assessment)   Basic ADL  Feeding: Minimum assistance  Oral Facial Hygiene/Grooming: Minimum assistance  Bathing: Moderate assistance  Upper Body Dressing: Moderate assistance  Lower Body Dressing: Maximum assistance  Toileting: Maximum assistance Instrumental ADL  Meal Preparation: Total assistance  Homemaking: Total assistance   Grooming/Bathing/Dressing Activities of Daily Living     Cognitive Retraining  Safety/Judgement: Fall prevention;Home safety                 Functional Transfers  Toilet Transfer : Moderate assistance  Tub Transfer: Maximum assistance  Shower Transfer: Moderate assistance     Bed/Mat Mobility  Rolling: Minimum assistance  Supine to Sit: Minimum assistance  Sit to Supine: Minimum assistance  Sit to Stand: Moderate assistance  Bed to Chair: Minimum assistance;Assist x2  Scooting: Stand-by asssistance          Most Recent Physical Functioning:   Gross Assessment:  Strength: Generally decreased, functional  Coordination: Generally decreased, functional               Posture:  Posture (WDL): Exceptions to WDL  Posture Assessment:  Forward head, Rounded shoulders  Balance:  Sitting: Impaired  Sitting - Static: Fair (occasional)  Sitting - Dynamic: Fair (occasional)  Standing: Impaired  Standing - Static: Fair  Standing - Dynamic : Poor Bed Mobility:  Rolling: Minimum assistance  Supine to Sit: Minimum assistance  Sit to Supine: Minimum assistance  Scooting: Stand-by asssistance  Wheelchair Mobility:     Transfers:  Sit to Stand: Moderate assistance  Stand to Sit: Moderate assistance  Bed to Chair: Minimum assistance;Assist x2                    Patient Vitals for the past 6 hrs:       BP Pulse   17 1146 140/88 73        Mental Status  Neurologic State: Alert, Confused  Orientation Level: Disoriented to place, Disoriented to situation, Disoriented to time, Oriented to person  Cognition: Decreased attention/concentration, Decreased command following, Impaired decision making, Memory loss, Poor safety awareness  Perception: Cues to maintain midline in standing  Perseveration: Perseverates during mobility  Safety/Judgement: Fall prevention, Home safety                               Physical Skills Involved:  1. Balance  2. Mobility  3. Strength  4. Endurance Cognitive Skills Affected (resulting in the inability to perform in a timely and safe manner):  1. Attending  2. Problem Solving  3. Mental Sequencing  4. Learning  5. Remembering Psychosocial Skills Affected:  1. Habits  2. Routines and Behaviors  3. Environmental Adaptations   Number of elements that affect the Plan of Care: 5+:  HIGH COMPLEXITY   CLINICAL DECISION MAKIN [facility] Mobility Inpatient Short Form  How much help from another person does the patient currently need. .. Total A Lot A Little None   1. Putting on and taking off regular lower body clothing?   [ ] 1   [X] 2   [ ] 3   [ ] 4   2. Bathing (including washing, rinsing, drying)? [ ] 1   [X] 2   [ ] 3   [ ] 4   3. Toileting, which includes using toilet, bedpan or urinal?   [ ] 1   [X] 2   [ ] 3   [ ] 4   4. Putting on and taking off regular upper body clothing?   [ ] 1   [X] 2   [ ] 3   [ ] 4   5.   Taking care of personal grooming such as brushing teeth? [ ] 1   [ ] 2   [X] 3   [ ] 4   6. Eating meals? [ ] 1   [ ] 2   [X] 3   [ ] 4   © 2007, Trustees of [address] [place] [address] [address] [id number], under license to [employer]. All rights reserved    Score:  Initial: 14 Most Recent: X (Date: -- )     Interpretation of Tool:  Represents activities that are increasingly more difficult (i.e. Bed mobility, Transfers, Gait). Score 24 23 22-20 19-15 14-10 9-7 6       Modifier CH CI CJ CK CL CM CN         · Self Care:               - CURRENT STATUS:    CK - 40%-59% impaired, limited or restricted               - GOAL STATUS:           CJ - 20%-39% impaired, limited or restricted               - D/C STATUS:                       ---------------To be determined---------------  Payor: CARE IMPROVEMENT PLUS [place] / Product Type: [employer] Medicare /       Medical Necessity:     · Patient demonstrates good and excellent rehab potential due to higher previous functional level. Reason for Services/Other Comments:  · Patient continues to require skilled intervention due to decreased independence and safety with ADL/functional transfers. Use of outcome tool(s) and clinical judgement create a POC that gives a: MODERATE COMPLEXITY             TREATMENT:   (In addition to Assessment/Re-Assessment sessions the following treatments were rendered)      Pre-treatment Symptoms/Complaints:  None  Pain: Initial:   Pain Intensity 1: 0  Post Session:  0/10      Assessment/Reassessment only, no treatment provided today     Treatment/Session Assessment:         Response to Treatment:  Evaluation only.      Interdisciplinary Collaboration:   · Occupational Therapist  · Registered Nurse  · Physician  · Rehabilitation Attendant     After treatment position/precautions:   · Supine in bed  · Bed alarm/tab alert on  · Bed/Chair-wheels locked  · Call light within reach  · RN notified  · Family at bedside  · Restraints Compliance with Program/Exercises: Will assess as treatment progresses. Recommendations/Intent for next treatment session:   \"Next visit will focus on advancements to more challenging activities and reduction in assistance provided\"       Total Treatment Duration:  OT Patient Time In/Time Out  Time In: 1345  Time Out: [address] OT

## 2017-01-19 NOTE — PROGRESS NOTES
Hospitalist Progress Note    2017  Admit Date: 2017 10:51 AM   NAME: Ranjan Myers   :  1940   MRN:  [id number]   Attending: Kurt Wyatt MD  PCP:  Nicolas Sahu MD      Admitted for:acute metabolic encephalopathy, Dementia    SUBJECTIVE:   Patient this morning appears more awake and still confused more lethargic, no fever chills no shortness of breath. Review of Systems negative with exception of pertinent positives noted above  Past medical history unchanged from H&P    PHYSICAL EXAM     Visit Vitals    /88    Pulse 73    Temp 97.4 °F (36.3 °C)    Resp 22    Ht 6' 3.5\" (1.918 m)    Wt 96.2 kg (212 lb)    SpO2 98%    BMI 26.15 kg/m2      Temp (24hrs), Av.3 °F (36.8 °C), Min:97.4 °F (36.3 °C), Max:99.1 °F (37.3 °C)    Oxygen Therapy  O2 Sat (%): 98 % (17 0740)  Pulse via Oximetry: 87 beats per minute (17 0022)  O2 Device: Room air (17 0307)    Intake/Output Summary (Last 24 hours) at 17 1315  Last data filed at 17 0900   Gross per 24 hour   Intake              600 ml   Output             1200 ml   Net             -600 ml        General: No acute distress  mucous membranes pink and moist acyanotic anicteric  Neck:  Supple full range of motion  Lungs:  Air entry equal bilaterally, no crepitations rales rhonchi   Heart:  Regular rate and rhythm, EJS murmer , rub, or gallop  Abdomen: Soft, Non distended, Non tender, no rebound guarding Positive bowel sounds  Extremities: No cyanosis, clubbing or edema  Neurologic:  More confused, today,   Musculoskeletal: no   Joint swelling tenderness erythema  Skin:  No erythema, rashes noted          LAB  No results for input(s): GLUCPOC in the last 72 hours.     No lab exists for component: [place]   Recent Labs      17   0632   WBC  9.9   HGB  11.3*   HCT  33.3*   PLT  207     Recent Labs      17   0632   17   0035   NA  145   < >   --    K  3.1*   < >   --    CL  110*   < >   -- CO2  28   < >   --    GLU  137*   < >   --    BUN  22   < >   --    CREA  1.79*   < >   --    CA  8.6   < >   --    TROIQ   --    --   0.12*   ALB  3.4   --    --    TBILI  0.7   --    --    ALT  13   --    --    SGOT  21   --    --     < > = values in this interval not displayed. EKG and imaging reviewed personally by me  Mri Brain Wo Cont    Result Date: 1/18/2017  MRI BRAIN WITHOUT CONTRAST. HISTORY: Confusion and altered mental status. COMPARISON:  None TECHNIQUE:  Sagittal T1, axial T1, T2, FLAIR, gradient echo, diffusion with ADC map and coronal FLAIR. FINDINGS:  Mild periventricular and centrum semiovale FLAIR and T2 white matter hyperintensities are present; these are nonspecific and may represent chronic small vessel disease. Diffusion images do not demonstrate any areas of restricted diffusion to suggest acute infarction. Mild atrophy. No midline shift, mass or mass effect. Gradient echo images are unremarkable. No evidence of acute hemorrhage. The lateral ventricles are normal size. The pituitary, parasellar and midline structures are unremarkable on the sagittal T1 images. There are normal T2 flow-voids in the major vessels. Posterior fossa structures are unremarkable. The basal ganglia appear symmetric. Orbits demonstrates a calcified left globe. .  Paranasal sinuses are clear. IMPRESSION: Mild chronic small vessel disease. No acute abnormality. Results for orders placed or performed during the hospital encounter of 01/17/17   EKG, 12 LEAD, INITIAL   Result Value Ref Range    Systolic BP  mmHg    Diastolic BP  mmHg    Ventricular Rate 62 BPM    Atrial Rate 62 BPM    P-R Interval 208 ms    QRS Duration 92 ms    Q-T Interval 430 ms    QTC Calculation (Bezet) 436 ms    Calculated P Axis 49 degrees    Calculated R Axis -26 degrees    Calculated T Axis -16 degrees    Diagnosis       !! AGE AND GENDER SPECIFIC ECG ANALYSIS !!   Normal sinus rhythm  Moderate voltage criteria for LVH, may be normal variant  Borderline ECG  Confirmed by Aaron Messer MD (), NADEEN MCCLAIN ([id number]) on 1/17/2017 1:53:13 PM       XR Results (most recent):    Results from [place] encounter on 01/17/17   XR SACRUM AND COCCYX   Narrative LUMBAR SPINE AND SACRUM/COCCYX RADIOGRAPHS, 1/17/2017    CLINICAL HISTORY:  Increased confusion. Cuts demonstrates all over. Tailbone  pain. FINDINGS:    Lumbar spine:   Five lumbar type vertebrae are visualized. Mild alignment abnormalities are  seen with grade 1 retrolisthesis of L1 on L2, and L2 on L3. No definite acute  posterior element abnormality is seen at these levels. Vertebral body height is  maintained at all levels. Moderate diffuse discogenic degenerative changes are  seen throughout the lumbar spine. Mild apex rightward upper, and apex low for  lower curvatures are seen of the lumbar spine. Sacrum:   AP and lateral views of the sacrum and coccyx demonstrate no fracture or other  abnormality. The sacroiliac joints are nonwidened. No definite acute abnormality  is seen of the partially visualized bony pelvis. The pubic symphysis is  maintained. Impression IMPRESSION:   1. No acute osseous abnormality of the lumbar spine or sacrum/coccyx evident by  plain film imaging. 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 Doing better. · Altered mental status - likely dementia  He is a little worse not sleeping well at night as well as receiving haldol, likely worsening his mentation. Will discontinue this now. He has been historically very intolerant of benzos and opiods.  Will try to limit as much medications  · Parkinson disease - family requesting neurology consult, will do this.    · UTI - likely resolved will continue augmentin  ·   ·     DVT Prophylaxis: heparin  dispo likely rehab    Signed By: Larissa Castillo MD     January 19, 2017

## 2017-01-19 NOTE — PROGRESS NOTES
Pt resting quietly in bed on RA. Pt had restless night, up talking to son at bedside throughout the night. No s/s of acute distress noted. Report given to oncoming RN.

## 2017-01-20 PROBLEM — N18.30 ACUTE RENAL FAILURE SUPERIMPOSED ON STAGE 3 CHRONIC KIDNEY DISEASE (HCC): Status: ACTIVE | Noted: 2017-01-20

## 2017-01-20 PROBLEM — N17.9 ACUTE RENAL FAILURE SUPERIMPOSED ON STAGE 3 CHRONIC KIDNEY DISEASE (HCC): Status: ACTIVE | Noted: 2017-01-20

## 2017-01-20 LAB
ANION GAP BLD CALC-SCNC: 12 MMOL/L (ref 7–16)
BUN SERPL-MCNC: 21 MG/DL (ref 8–23)
CALCIUM SERPL-MCNC: 8.8 MG/DL (ref 8.3–10.4)
CHLORIDE SERPL-SCNC: 107 MMOL/L (ref 98–107)
CO2 SERPL-SCNC: 25 MMOL/L (ref 21–32)
CREAT SERPL-MCNC: 1.93 MG/DL (ref 0.8–1.5)
ERYTHROCYTE [DISTWIDTH] IN BLOOD BY AUTOMATED COUNT: 14.4 % (ref 11.9–14.6)
GLUCOSE SERPL-MCNC: 141 MG/DL (ref 65–100)
HCT VFR BLD AUTO: 35.3 % (ref 41.1–50.3)
HGB BLD-MCNC: 12.1 G/DL (ref 13.6–17.2)
MCH RBC QN AUTO: 27.9 PG (ref 26.1–32.9)
MCHC RBC AUTO-ENTMCNC: 34.3 G/DL (ref 31.4–35)
MCV RBC AUTO: 81.5 FL (ref 79.6–97.8)
PLATELET # BLD AUTO: 214 K/UL (ref 150–450)
PMV BLD AUTO: 10.2 FL (ref 10.8–14.1)
POTASSIUM SERPL-SCNC: 3.3 MMOL/L (ref 3.5–5.1)
RBC # BLD AUTO: 4.33 M/UL (ref 4.23–5.67)
SODIUM SERPL-SCNC: 144 MMOL/L (ref 136–145)
WBC # BLD AUTO: 10.9 K/UL (ref 4.3–11.1)

## 2017-01-20 PROCEDURE — 85027 COMPLETE CBC AUTOMATED: CPT | Performed by: HOSPITALIST

## 2017-01-20 PROCEDURE — 74011250637 HC RX REV CODE- 250/637: Performed by: HOSPITALIST

## 2017-01-20 PROCEDURE — 74011250636 HC RX REV CODE- 250/636: Performed by: HOSPITALIST

## 2017-01-20 PROCEDURE — 36415 COLL VENOUS BLD VENIPUNCTURE: CPT | Performed by: HOSPITALIST

## 2017-01-20 PROCEDURE — 74011000258 HC RX REV CODE- 258: Performed by: PSYCHIATRY & NEUROLOGY

## 2017-01-20 PROCEDURE — 80048 BASIC METABOLIC PNL TOTAL CA: CPT | Performed by: HOSPITALIST

## 2017-01-20 PROCEDURE — 74011000250 HC RX REV CODE- 250: Performed by: PSYCHIATRY & NEUROLOGY

## 2017-01-20 PROCEDURE — 65270000029 HC RM PRIVATE

## 2017-01-20 RX ADMIN — AMOXICILLIN AND CLAVULANATE POTASSIUM 1 TABLET: 875; 125 TABLET, FILM COATED ORAL at 22:58

## 2017-01-20 RX ADMIN — TAMSULOSIN HYDROCHLORIDE 0.4 MG: 0.4 CAPSULE ORAL at 09:38

## 2017-01-20 RX ADMIN — ASPIRIN 81 MG: 81 TABLET, COATED ORAL at 09:38

## 2017-01-20 RX ADMIN — ESCITALOPRAM OXALATE 10 MG: 10 TABLET ORAL at 09:38

## 2017-01-20 RX ADMIN — Medication 10 ML: at 22:59

## 2017-01-20 RX ADMIN — ATORVASTATIN CALCIUM 40 MG: 40 TABLET, FILM COATED ORAL at 09:38

## 2017-01-20 RX ADMIN — HEPARIN SODIUM 5000 UNITS: 5000 INJECTION, SOLUTION INTRAVENOUS; SUBCUTANEOUS at 18:11

## 2017-01-20 RX ADMIN — Medication 10 ML: at 18:10

## 2017-01-20 RX ADMIN — VITAMIN D, TAB 1000IU (100/BT) 1000 UNITS: 25 TAB at 09:38

## 2017-01-20 RX ADMIN — VALPROATE SODIUM 1000 MG: 100 INJECTION, SOLUTION INTRAVENOUS at 00:00

## 2017-01-20 RX ADMIN — AMLODIPINE BESYLATE 10 MG: 10 TABLET ORAL at 09:38

## 2017-01-20 RX ADMIN — LABETALOL HCL 200 MG: 200 TABLET, FILM COATED ORAL at 09:38

## 2017-01-20 RX ADMIN — Medication 10 ML: at 18:15

## 2017-01-20 RX ADMIN — HEPARIN SODIUM 5000 UNITS: 5000 INJECTION, SOLUTION INTRAVENOUS; SUBCUTANEOUS at 09:38

## 2017-01-20 RX ADMIN — Medication 5 ML: at 06:09

## 2017-01-20 RX ADMIN — HEPARIN SODIUM 5000 UNITS: 5000 INJECTION, SOLUTION INTRAVENOUS; SUBCUTANEOUS at 01:57

## 2017-01-20 RX ADMIN — LABETALOL HCL 200 MG: 200 TABLET, FILM COATED ORAL at 22:58

## 2017-01-20 RX ADMIN — FINASTERIDE 5 MG: 5 TABLET, FILM COATED ORAL at 09:38

## 2017-01-20 RX ADMIN — CLOPIDOGREL BISULFATE 75 MG: 75 TABLET ORAL at 09:38

## 2017-01-20 RX ADMIN — LEVOTHYROXINE SODIUM 137 MCG: 112 TABLET ORAL at 06:07

## 2017-01-20 RX ADMIN — AMOXICILLIN AND CLAVULANATE POTASSIUM 1 TABLET: 875; 125 TABLET, FILM COATED ORAL at 09:38

## 2017-01-20 NOTE — PROGRESS NOTES
Hospitalist Progress Note    2017  Admit Date: 2017 10:51 AM   NAME: Misty Vela   :  1940   MRN:  [id number]   Attending: Jorje Cuba MD  PCP:  Camille Álvarez MD      Admitted for:ambrocio possibly due to carbidopa, metabolic encephalopathy    SUBJECTIVE:   Patient this morning sleeping, did not sleep last night, but is this morning. holding haldol, and benzos, evaluated by neurology yesterday, given depakote last night to help sleep. Denies any pain, no fever no shortness. Review of Systems negative with exception of pertinent positives noted above  Past medical history unchanged from H&P    PHYSICAL EXAM     Visit Vitals    /70 (BP 1 Location: Right arm, BP Patient Position: At rest)    Pulse 60    Temp 98.1 °F (36.7 °C)    Resp 20    Ht 6' 3.5\" (1.918 m)    Wt 96.2 kg (212 lb)    SpO2 92%    BMI 26.15 kg/m2      Temp (24hrs), Av.7 °F (36.5 °C), Min:97.5 °F (36.4 °C), Max:98.1 °F (36.7 °C)    Oxygen Therapy  O2 Sat (%): 92 % (17 1125)  Pulse via Oximetry: 87 beats per minute (17 0022)  O2 Device: Room air (17 0307)    Intake/Output Summary (Last 24 hours) at 17 1157  Last data filed at 17 0730   Gross per 24 hour   Intake              600 ml   Output                0 ml   Net              600 ml        General: No acute distress  mucous membranes pink and moist acyanotic anicteric  Neck:  Supple full range of motion  Lungs:  Air entry equal bilaterally, no crepitations rales rhonchi   Heart:  Regular rate and rhythm,  No murmur, rub, or gallop  Abdomen: Soft, Non distended, Non tender, no rebound guarding Positive bowel sounds  Extremities: No cyanosis, clubbing or edema  Neurologic:  Still confused, but sleeping now. No focal weakness. Musculoskeletal: no   Joint swelling tenderness erythema  Skin:  No erythema, rashes noted          LAB  No results for input(s): GLUCPOC in the last 72 hours.     No lab exists for component: [place]   Recent Labs      01/20/17   0556   WBC  10.9   HGB  12.1*   HCT  35.3*   PLT  214     Recent Labs      01/20/17   0556  01/19/17   0632   01/18/17   0035   NA  144  145   < >   --    K  3.3*  3.1*   < >   --    CL  107  110*   < >   --    CO2  25  28   < >   --    GLU  141*  137*   < >   --    BUN  21  22   < >   --    CREA  1.93*  1.79*   < >   --    CA  8.8  8.6   < >   --    TROIQ   --    --    --   0.12*   ALB   --   3.4   --    --    TBILI   --   0.7   --    --    ALT   --   13   --    --    SGOT   --   21   --    --     < > = values in this interval not displayed. EKG and imaging reviewed personally by me  No results found. Results for orders placed or performed during the hospital encounter of 01/17/17   EKG, 12 LEAD, INITIAL   Result Value Ref Range    Systolic BP  mmHg    Diastolic BP  mmHg    Ventricular Rate 62 BPM    Atrial Rate 62 BPM    P-R Interval 208 ms    QRS Duration 92 ms    Q-T Interval 430 ms    QTC Calculation (Bezet) 436 ms    Calculated P Axis 49 degrees    Calculated R Axis -26 degrees    Calculated T Axis -16 degrees    Diagnosis       !! AGE AND GENDER SPECIFIC ECG ANALYSIS !! Normal sinus rhythm  Moderate voltage criteria for LVH, may be normal variant  Borderline ECG  Confirmed by Taye Whiteside MD (), NADEEN MCCLAIN ([id number]) on 1/17/2017 1:53:13 PM       XR Results (most recent):    Results from [place] encounter on 01/17/17   XR SACRUM AND COCCYX   Narrative LUMBAR SPINE AND SACRUM/COCCYX RADIOGRAPHS, 1/17/2017    CLINICAL HISTORY:  Increased confusion. Cuts demonstrates all over. Tailbone  pain. FINDINGS:    Lumbar spine:   Five lumbar type vertebrae are visualized. Mild alignment abnormalities are  seen with grade 1 retrolisthesis of L1 on L2, and L2 on L3. No definite acute  posterior element abnormality is seen at these levels. Vertebral body height is  maintained at all levels.   Moderate diffuse discogenic degenerative changes are  seen throughout the lumbar spine. Mild apex rightward upper, and apex low for  lower curvatures are seen of the lumbar spine. Sacrum:   AP and lateral views of the sacrum and coccyx demonstrate no fracture or other  abnormality. The sacroiliac joints are nonwidened. No definite acute abnormality  is seen of the partially visualized bony pelvis. The pubic symphysis is  maintained. Impression IMPRESSION:   1. No acute osseous abnormality of the lumbar spine or sacrum/coccyx evident by  plain film imaging. 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 Will continue to HOLD ALL haldol, neuroleptics, Benzodiazepines  · Acute on chronic renal failure - approaching baseline.  Previous creat 1.6 doing better  · No evidence of NPH  · Will plan for rehab tomorrow    DVT Prophylaxis: heparin  dispo - rehab    Signed By: Jakob Sanabria MD     January 20, 2017

## 2017-01-20 NOTE — CONSULTS
[address] Neurology Consult    Patient ID:  Gilford Eans  [id number]  36 y.o.  1940    Subjective:     Date of Consultation:  January 19, 2017    Referring Physician: Noa Avalos    Reason for Consultation:  Encephalopathy    History of Present Illness:   Gilford Eans is a 67 y.o.  male who presents with mental status changes. Over the past 2 years there has been a drastic decline in his function. He's gone from \"riding a Leif\" to being almost unable to walk. On two occasions, Dr. Valerio Larson in [facility] has performed an LP with good results, suggesting NPH. However, neurosurgical consultation has not been arranged for some reason, nor has he seen a neurologist other than [facility]cc[facility],\" until just a couple weeks ago when he saw Dr. Chelsea Lanza. She noted that there were Parkinsonian characteristics, REM behavior disorder characteristics, and features suggesting NPH. Two medication changes were initiated. Temazepam 30 mg qhs was changed to Klonopin 0.5 qhs, which works better for RBD, and sinemet 25/250 TID was changed to 2x25/100 TID. These two changes were apparently rapidly followed by a decline in his status. He had trouble with increasing confusion, poor sleep quality, increasing agitation. He is admitted for further evaluation. I reviewed a brain MRi done this admission and see no features to suggest NPH. There is mild atrophy and microvascular change. History reviewed. No pertinent past medical history. Past Surgical History   Procedure Laterality Date    Hx heent       eye removed; glass eye    Hx orthopaedic       knee replacement    Hx orthopaedic Left      rotator cuff    Pr cardiac surg procedure unlist       stent placement    Hx thyroidectomy        History reviewed. No pertinent family history.    Social History   Substance Use Topics    Smoking status: Former Smoker    Smokeless tobacco: Not on file    Alcohol use No      Allergies   Allergen Reactions    Tramadol Other (comments)     Hallucination      Hydrocodone-Acetaminophen Other (comments)     Hallucination      Sulfa (Sulfonamide Antibiotics) Unknown (comments)     CAN'T REMEMBER      Prior to Admission medications    Medication Sig Start Date End Date Taking? Authorizing Provider   ciprofloxacin HCl (CIPRO) 250 mg tablet Take 250 mg by mouth every twelve (12) hours. Yes Historical Provider   amoxicillin-clavulanate (AUGMENTIN) 875-125 mg per tablet Take 1 Tab by mouth two (2) times a day. Yes Historical Provider   melatonin 5 mg cap capsule Take 5 mg by mouth nightly. Yes Historical Provider   amLODIPine (NORVASC) 10 mg tablet daily. 9/1/15  Yes Historical Provider   aspirin delayed-release 81 mg tablet Take 81 mg by mouth. Yes Historical Provider   atorvastatin (LIPITOR) 40 mg tablet daily. 10/8/15  Yes Historical Provider   clopidogrel (PLAVIX) 75 mg tab daily. 10/8/15  Yes Historical Provider   escitalopram oxalate (LEXAPRO) 10 mg tablet daily. 10/8/15  Yes Historical Provider   finasteride (PROSCAR) 5 mg tablet daily. 10/8/15  Yes Historical Provider   omeprazole (PRILOSEC) 20 mg capsule Take 20 mg by mouth daily. Yes Historical Provider   levothyroxine (SYNTHROID) 137 mcg tablet Take  by mouth Daily (before breakfast). Yes Historical Provider   lisinopril (PRINIVIL, ZESTRIL) 40 mg tablet Take 40 mg by mouth daily. Yes Historical Provider   tamsulosin (FLOMAX) 0.4 mg capsule Take 0.4 mg by mouth daily. Yes Historical Provider   carbidopa-levodopa (SINEMET)  mg per tablet Take 2 Tabs by mouth three (3) times daily. 1/3/17  Yes Demetrio Celaya MD   cholecalciferol (VITAMIN D3) 1,000 unit tablet Take 1,000 Units by mouth. Historical Provider   diphenoxylate-atropine (LOMOTIL) 2.5-0.025 mg per tablet Take 2 Tabs by mouth every four (4) hours. Historical Provider   nitroglycerin (NITROSTAT) 0.4 mg SL tablet by SubLINGual route every five (5) minutes as needed for Chest Pain.     Historical Provider   temazepam (RESTORIL) 30 mg capsule Take  by mouth nightly as needed for Sleep. Historical Provider       Review of Systems:  Review of systems not obtained due to patient factors. Per the family, he has been \"unable to sleep. \"  He was up last night \"fighting and working on things\" all night long. Objective:     Patient Vitals for the past 8 hrs:   BP Temp Pulse Resp SpO2   01/19/17 1906 151/81 97.5 °F (36.4 °C) 62 20 92 %   01/19/17 1606 129/63 97.5 °F (36.4 °C) 76 20 97 %       Physical Exam:    Visit Vitals    /81    Pulse 62    Temp 97.5 °F (36.4 °C)    Resp 20    Ht 6' 3.5\" (1.918 m)    Wt 96.2 kg (212 lb)    SpO2 92%    BMI 26.15 kg/m2     General appearance: alert, cooperative, no distress, appears stated age  Eyes: positive findings: right eye missing. Neurologic: Mental status: alertness: alert, orientation: unable to give a straight answer to any questions, affect: full range, thought content exhibits tangential connections, loose associations, flight of ideas, and nonsequitors. Cranial nerves: left eye moves fully. Pupil 3 mm and reactive. Face symmetric. Speech clear. Sensory: unreliable. Says cold tuning fork is not cold. No obvious asymmetries reported to cold/vibration. Motor: Mild rest tremor in arms. Minimally increased tone in arms, but not legs. Can't detect true cogwheeling (paratonia). Strength is excellent 5/5. Reflexes: Trace to 1+ throughout except absent ankle jerks. Mute plantar responses. Coordination: Opens and closes hands rapidly. Gait: Not tested. 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 I do NOT believe this is acute decompensation of NPH.   He lacks clear PD features, and the medication changes would not have produced a PD decompensation. The change from temazepam to klonopin would be unlikely to cause a dramatic psychosis/delirium in my opinion. This leaves me with carbidopa. I believe he has a \"secondary ambrocio\" due to carbidopa. There are many medications that can produce secondary ambrocio. Carbidopa/levodopa is well known to produce ambrocio, rapid cycling bipolar states, and other types of neuropsychiatric complications. The lack of sleep, extreme agitation, psychomotor restlessness, hallucinations/delusions, tangential/nonsensical speech of abrupt onset in the absence of sensory aphasia from stroke -- all point to ambrocio in my opinion. Plan:     1. IV depacon 1 gm tonight to see if this will give him some rest.  I would try to avoid giving him more haldol or neuroleptics, which may worsen his motor disturbance. 2.  Hold the carbidopa/levodopa and benzodiazepines. 3.  PT to get him up on his feet.     Thanks,        Signed By:  Frank Weber MD     January 19, 2017

## 2017-01-20 NOTE — PROGRESS NOTES
Scheduled Normodyne given. Will monitor.      01/19/17 4001   Vital Signs   Temp 97.7 °F (36.5 °C)   Pulse (Heart Rate) 70   Resp Rate 20   BP (!) 166/96   MAP (Calculated) 119   Oxygen Therapy   O2 Sat (%) 93 %

## 2017-01-20 NOTE — PROGRESS NOTES
Received bedside shift report from Shanae Mason RN. Pt lying in bed. Increased confusion, in bilateral soft wrist restraints. No apparent distress. Respirations even and unlabored. Family at bedside was instructed to call for assistance with needs, as they arise. Family voiced understanding.

## 2017-01-20 NOTE — PROGRESS NOTES
Met with patient and family regarding discharge instructions. Patient somnolent at the time of my visit. Per family, this is the best patient has rested since 1/9/2017. Patient has a relative working at [employer] in [facility], and family requests short-term rehab at [facility]. Patient remains restrained at this time. He will have to be out of restraints for at least 24 hours prior to transfer. In addition, patient will need to be a little more participative with PT/OT in order to transfer as he must be able to participate in therapy in order to qualify for short-term rehab. It will be Sunday at the absolute earliest before patient may transfer, depending on restraint status and patient's ability to actively work with PT/OT. Patient's family has also brought his home CPAP and have it at bedside. Would request that patient be assisted to put on his CPAP when sleeping as well. Case Management will continue to follow.     Care Management Interventions  Transition of Care Consult (CM Consult): Discharge Planning  Discharge Durable Medical Equipment: No  Physical Therapy Consult: Yes  Occupational Therapy Consult: Yes  Speech Therapy Consult: No  Current Support Network: Own Home  Confirm Follow Up Transport: Family  Plan discussed with Pt/Family/Caregiver: Yes  Freedom of Choice Offered: Yes  Discharge Location  Discharge Placement: Rehab Unit Subacute

## 2017-01-21 LAB
ANION GAP BLD CALC-SCNC: 12 MMOL/L (ref 7–16)
BUN SERPL-MCNC: 27 MG/DL (ref 8–23)
CALCIUM SERPL-MCNC: 8.7 MG/DL (ref 8.3–10.4)
CHLORIDE SERPL-SCNC: 106 MMOL/L (ref 98–107)
CO2 SERPL-SCNC: 25 MMOL/L (ref 21–32)
CREAT SERPL-MCNC: 2.01 MG/DL (ref 0.8–1.5)
ERYTHROCYTE [DISTWIDTH] IN BLOOD BY AUTOMATED COUNT: 14.4 % (ref 11.9–14.6)
GLUCOSE SERPL-MCNC: 143 MG/DL (ref 65–100)
HCT VFR BLD AUTO: 33.7 % (ref 41.1–50.3)
HGB BLD-MCNC: 11.6 G/DL (ref 13.6–17.2)
MCH RBC QN AUTO: 27.9 PG (ref 26.1–32.9)
MCHC RBC AUTO-ENTMCNC: 34.4 G/DL (ref 31.4–35)
MCV RBC AUTO: 81 FL (ref 79.6–97.8)
MM INDURATION POC: NORMAL MM (ref 0–5)
PLATELET # BLD AUTO: 212 K/UL (ref 150–450)
PMV BLD AUTO: 10.1 FL (ref 10.8–14.1)
POTASSIUM SERPL-SCNC: 3.3 MMOL/L (ref 3.5–5.1)
PPD POC: NORMAL NEGATIVE
RBC # BLD AUTO: 4.16 M/UL (ref 4.23–5.67)
SODIUM SERPL-SCNC: 143 MMOL/L (ref 136–145)
WBC # BLD AUTO: 10.2 K/UL (ref 4.3–11.1)

## 2017-01-21 PROCEDURE — 65270000029 HC RM PRIVATE

## 2017-01-21 PROCEDURE — 74011000250 HC RX REV CODE- 250: Performed by: PSYCHIATRY & NEUROLOGY

## 2017-01-21 PROCEDURE — 74011250637 HC RX REV CODE- 250/637: Performed by: PSYCHIATRY & NEUROLOGY

## 2017-01-21 PROCEDURE — 74011250637 HC RX REV CODE- 250/637: Performed by: HOSPITALIST

## 2017-01-21 PROCEDURE — 85027 COMPLETE CBC AUTOMATED: CPT | Performed by: HOSPITALIST

## 2017-01-21 PROCEDURE — 80048 BASIC METABOLIC PNL TOTAL CA: CPT | Performed by: HOSPITALIST

## 2017-01-21 PROCEDURE — 97530 THERAPEUTIC ACTIVITIES: CPT

## 2017-01-21 PROCEDURE — 74011250636 HC RX REV CODE- 250/636: Performed by: HOSPITALIST

## 2017-01-21 PROCEDURE — 74011000258 HC RX REV CODE- 258: Performed by: PSYCHIATRY & NEUROLOGY

## 2017-01-21 PROCEDURE — 36415 COLL VENOUS BLD VENIPUNCTURE: CPT | Performed by: HOSPITALIST

## 2017-01-21 RX ORDER — QUETIAPINE FUMARATE 25 MG/1
25 TABLET, FILM COATED ORAL
Status: DISCONTINUED | OUTPATIENT
Start: 2017-01-21 | End: 2017-01-22

## 2017-01-21 RX ORDER — MEMANTINE HYDROCHLORIDE 5 MG/1
5 TABLET ORAL DAILY
Status: DISCONTINUED | OUTPATIENT
Start: 2017-01-22 | End: 2017-01-27 | Stop reason: HOSPADM

## 2017-01-21 RX ORDER — RIVASTIGMINE 4.6 MG/24H
1 PATCH, EXTENDED RELEASE TRANSDERMAL DAILY
Status: DISCONTINUED | OUTPATIENT
Start: 2017-01-22 | End: 2017-01-27 | Stop reason: HOSPADM

## 2017-01-21 RX ADMIN — AMOXICILLIN AND CLAVULANATE POTASSIUM 1 TABLET: 875; 125 TABLET, FILM COATED ORAL at 21:58

## 2017-01-21 RX ADMIN — HEPARIN SODIUM 5000 UNITS: 5000 INJECTION, SOLUTION INTRAVENOUS; SUBCUTANEOUS at 01:05

## 2017-01-21 RX ADMIN — VITAMIN D, TAB 1000IU (100/BT) 1000 UNITS: 25 TAB at 09:31

## 2017-01-21 RX ADMIN — AMLODIPINE BESYLATE 10 MG: 10 TABLET ORAL at 09:31

## 2017-01-21 RX ADMIN — HEPARIN SODIUM 5000 UNITS: 5000 INJECTION, SOLUTION INTRAVENOUS; SUBCUTANEOUS at 17:32

## 2017-01-21 RX ADMIN — LEVOTHYROXINE SODIUM 137 MCG: 112 TABLET ORAL at 07:48

## 2017-01-21 RX ADMIN — AMOXICILLIN AND CLAVULANATE POTASSIUM 1 TABLET: 875; 125 TABLET, FILM COATED ORAL at 09:31

## 2017-01-21 RX ADMIN — Medication 10 ML: at 07:51

## 2017-01-21 RX ADMIN — Medication 10 ML: at 21:58

## 2017-01-21 RX ADMIN — ATORVASTATIN CALCIUM 40 MG: 40 TABLET, FILM COATED ORAL at 09:31

## 2017-01-21 RX ADMIN — Medication 5 ML: at 17:33

## 2017-01-21 RX ADMIN — ASPIRIN 81 MG: 81 TABLET, COATED ORAL at 09:30

## 2017-01-21 RX ADMIN — LABETALOL HCL 200 MG: 200 TABLET, FILM COATED ORAL at 09:32

## 2017-01-21 RX ADMIN — TAMSULOSIN HYDROCHLORIDE 0.4 MG: 0.4 CAPSULE ORAL at 09:30

## 2017-01-21 RX ADMIN — DIPHENHYDRAMINE HYDROCHLORIDE 12.5 MG: 50 INJECTION, SOLUTION INTRAMUSCULAR; INTRAVENOUS at 22:08

## 2017-01-21 RX ADMIN — ESCITALOPRAM OXALATE 10 MG: 10 TABLET ORAL at 09:31

## 2017-01-21 RX ADMIN — CLOPIDOGREL BISULFATE 75 MG: 75 TABLET ORAL at 09:31

## 2017-01-21 RX ADMIN — HEPARIN SODIUM 5000 UNITS: 5000 INJECTION, SOLUTION INTRAVENOUS; SUBCUTANEOUS at 09:32

## 2017-01-21 RX ADMIN — QUETIAPINE FUMARATE 25 MG: 25 TABLET, FILM COATED ORAL at 21:59

## 2017-01-21 RX ADMIN — VALPROATE SODIUM 1000 MG: 100 INJECTION, SOLUTION INTRAVENOUS at 21:58

## 2017-01-21 RX ADMIN — FINASTERIDE 5 MG: 5 TABLET, FILM COATED ORAL at 09:32

## 2017-01-21 NOTE — PROGRESS NOTES
Received bedside shift report from Adal Owen RN. Pt lying in bed. No apparent distress. Respirations even and unlabored. Family at bedside. Instructed to call for assistance with needs, as they arise. Pt voiced understanding.

## 2017-01-21 NOTE — PROGRESS NOTES
Hospitalist Progress Note    Subjective:   Daily Progress Note: 1/21/2017 11:17 AM    Mr. Dena Ross is a 67year old white male, with baseline dementia, who presented 1/17 for acute encephalopathy believed to possibly be ambrocio due to carbidopa medications. Neurology following along. Carbidopa, benzos/narcotics/haldol stopped and IV depacon qhs x one given by neurology. Slept better yesterday during day, still very agitated and manic appearing during night last night requiring restraints again. Today he recognizes his daughter in law and daughter, knows his birthday, is following most commands.      Current Facility-Administered Medications   Medication Dose Route Frequency    valproate (DEPACON) 1,000 mg in 0.9% sodium chloride 50 mL IVPB  1 g IntraVENous ONCE    magic mouthwash (TIFFANY) oral suspension 10 mL  10 mL Swish and Spit Q4H PRN    sodium chloride (OCEAN) 0.65 % nasal spray 2 Spray  2 Spray Both Nostrils PRN    lip protectant (BLISTEX) ointment   Topical PRN    labetalol (NORMODYNE) tablet 200 mg  200 mg Oral Q12H    amoxicillin-clavulanate (AUGMENTIN) 875-125 mg per tablet 1 Tab  1 Tab Oral Q12H    aspirin delayed-release tablet 81 mg  81 mg Oral DAILY    atorvastatin (LIPITOR) tablet 40 mg  40 mg Oral DAILY    cholecalciferol (VITAMIN D3) tablet 1,000 Units  1,000 Units Oral DAILY    clopidogrel (PLAVIX) tablet 75 mg  75 mg Oral DAILY    diphenoxylate-atropine (LOMOTIL) tablet 2 Tab  2 Tab Oral Q6H PRN    escitalopram oxalate (LEXAPRO) tablet 10 mg  10 mg Oral DAILY    finasteride (PROSCAR) tablet 5 mg  5 mg Oral DAILY    levothyroxine (SYNTHROID) tablet 137 mcg  137 mcg Oral ACB    nitroglycerin (NITROSTAT) tablet 0.4 mg  0.4 mg SubLINGual Q5MIN PRN    tamsulosin (FLOMAX) capsule 0.4 mg  0.4 mg Oral DAILY    sodium chloride (NS) flush 5-10 mL  5-10 mL IntraVENous Q8H    sodium chloride (NS) flush 5-10 mL  5-10 mL IntraVENous PRN    naloxone (NARCAN) injection 0.4 mg  0.4 mg IntraVENous PRN    diphenhydrAMINE (BENADRYL) injection 12.5 mg  12.5 mg IntraVENous Q4H PRN    ondansetron (ZOFRAN ODT) tablet 4 mg  4 mg Oral Q4H PRN    bisacodyl (DULCOLAX) suppository 10 mg  10 mg Rectal DAILY PRN    heparin (porcine) injection 5,000 Units  5,000 Units SubCUTAneous Q8H    hydrALAZINE (APRESOLINE) 20 mg/mL injection 20 mg  20 mg IntraVENous Q6H PRN    acetaminophen (TYLENOL) tablet 650 mg  650 mg Oral Q6H PRN    amLODIPine (NORVASC) tablet 10 mg  10 mg Oral DAILY        Review of Systems  Not obtainable due to mental status    Objective:     Visit Vitals    /74 (BP 1 Location: Right arm, BP Patient Position: At rest)    Pulse 62    Temp 97.9 °F (36.6 °C)    Resp 24    Ht 6' 3.5\" (1.918 m)    Wt 96.2 kg (212 lb)    SpO2 94%    BMI 26.15 kg/m2      O2 Device: Room air    Temp (24hrs), Av.2 °F (36.8 °C), Min:97.9 °F (36.6 °C), Max:98.7 °F (37.1 °C)          1901 -  0700  In: 720 [P.O.:720]  Out: -     General: awake, alert, oriented to person, no acute distress  Eyes: non icteric, EOMI  Neck; supple  CV: RRR  Abd; soft, non tender, non distended  Neuro: follows commands for me, able to identify his daughter/DIL and tell me his , currently not agitated    Additional comments: all labs, notes and studies from the past 24 hours have been personally reviewed today by me.      Data Review    Recent Results (from the past 24 hour(s))   CBC W/O DIFF    Collection Time: 17  6:15 AM   Result Value Ref Range    WBC 10.2 4.3 - 11.1 K/uL    RBC 4.16 (L) 4.23 - 5.67 M/uL    HGB 11.6 (L) 13.6 - 17.2 g/dL    HCT 33.7 (L) 41.1 - 50.3 %    MCV 81.0 79.6 - 97.8 FL    MCH 27.9 26.1 - 32.9 PG    MCHC 34.4 31.4 - 35.0 g/dL    RDW 14.4 11.9 - 14.6 %    PLATELET 477 194 - 179 K/uL    MPV 10.1 (L) 10.8 - 47.5 FL   METABOLIC PANEL, BASIC    Collection Time: 17  6:15 AM   Result Value Ref Range    Sodium 143 136 - 145 mmol/L    Potassium 3.3 (L) 3.5 - 5.1 mmol/L    Chloride 106 98 - 107 mmol/L    CO2 25 21 - 32 mmol/L    Anion gap 12 7 - 16 mmol/L    Glucose 143 (H) 65 - 100 mg/dL    BUN 27 (H) 8 - 23 MG/DL    Creatinine 2.01 (H) 0.8 - 1.5 MG/DL    GFR est AA 42 (L) >60 ml/min/1.73m2    GFR est non-AA 34 (L) >60 ml/min/1.73m2    Calcium 8.7 8.3 - 10.4 MG/DL         Assessment/Plan:     Principal Problem:    Acute renal failure (ARF) ([facility]) (1/17/2017)    Active Problems:    Parkinson disease ([facility] [address] (1/3/2017)      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 Told family during day to have lights/tv on with window open and calmly talk with him. At 9pm, lights/tv off with minimal stimulation. Neurology appreciated. Hopefully can remove restraints shortly and not reapply them.      Care Plan discussed with: the patient, his family members, his care team.       Signed By: Ev Maravilla MD     January 21, 2017
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
## 2017-01-21 NOTE — PROGRESS NOTES
Bedside report received from night nurse Siobhan Faustin. Assessment done as noted  Respiration even and unlabored 20/min; denies pain or nausea at present. Alert and orient to name only. Daughter at bedside and reports pt with \"bad night\" last night. States pt got confused last night and started to pull out lines and got combative and started to hit/swing at daughter. Remains in bilateral wrist restraints for safety. Daughter remains at bedside. Encouraged to call with needs.

## 2017-01-21 NOTE — PROGRESS NOTES
PT Note: Spoke with RN, García Colbert- patient with increased agitation and combativenes this AM. PT treatment withheld, will check back as schedule permits.        Niesha Gentile, PTA

## 2017-01-21 NOTE — PROGRESS NOTES
Pt up and ambulating with PT/OT. Family remains at bedside. Out of restraints for now. Will continue to monitor.

## 2017-01-21 NOTE — PROGRESS NOTES
[facility]    Mr. Sainz Cancer is doing better. He is sleeping to some extent, and slept about 5 hours last night (~10p-3a). Then, he was up and wild, again. His family clarified that he has been hallucinating on and off since at least March! There has never been a therapeutic trial of antipsychotic medication. Dr. Ricardo Hunter gave him the sinemet initially for the tremor which began in his right hand. He developed tremor that spread rapidly into both hands and his gait deteriorated greatly, along with the development of his dementia. There are wide fluctuations in his mental status, and he has RBD, too. This combination of features is highly characteristic of Lewy Body Dementia in my opinion. I'll suggest beginning low-dose seroquel for the psychosis. Continue depakote 1000 qhs. He can be switched to po, if he'll take it. I'll add namenda, too, and low-dose exelon. The cognitive enhancers can help with neuropsychiatric symptoms, too. Thank you.     Tommie Valdovinos MD
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
## 2017-01-21 NOTE — PROGRESS NOTES
Multiple visitors at bedside. Restraints removed per family and assisted pt to chair. NAD at present. Family wants to take patient for a walk. Due to recent fall and unsteadiness, family advised not to have pt up until evaluated per PT. Verbalizes understanding. PT called and made aware and stated will see pt in little while.

## 2017-01-21 NOTE — PROGRESS NOTES
Received telephone order from Dr. Odalys Conteh for restraints. No new orders given. Stated I should explain to the pt's daughter that neuro does not want to sedate him with any benzos or opioids at this time. Daughter informed.

## 2017-01-21 NOTE — PROGRESS NOTES
Vitals after starting restraints at 0330.     01/21/17 0339   Vital Signs   Temp 97.9 °F (36.6 °C)   Temp Source Oral   Pulse (Heart Rate) 74   Resp Rate 18   O2 Sat (%) 93 %   Level of Consciousness (!) Confused or Agitated   /72   MAP (Calculated) 91   BP 1 Method Automatic   BP 1 Location Right arm   BP Patient Position At rest   MEWS Score 3

## 2017-01-22 LAB
ANION GAP BLD CALC-SCNC: 8 MMOL/L (ref 7–16)
BUN SERPL-MCNC: 30 MG/DL (ref 8–23)
CALCIUM SERPL-MCNC: 9.1 MG/DL (ref 8.3–10.4)
CHLORIDE SERPL-SCNC: 108 MMOL/L (ref 98–107)
CO2 SERPL-SCNC: 29 MMOL/L (ref 21–32)
CREAT SERPL-MCNC: 1.92 MG/DL (ref 0.8–1.5)
ERYTHROCYTE [DISTWIDTH] IN BLOOD BY AUTOMATED COUNT: 14.4 % (ref 11.9–14.6)
GLUCOSE SERPL-MCNC: 137 MG/DL (ref 65–100)
HCT VFR BLD AUTO: 34.4 % (ref 41.1–50.3)
HGB BLD-MCNC: 11.8 G/DL (ref 13.6–17.2)
MAGNESIUM SERPL-MCNC: 2 MG/DL (ref 1.8–2.4)
MCH RBC QN AUTO: 27.9 PG (ref 26.1–32.9)
MCHC RBC AUTO-ENTMCNC: 34.3 G/DL (ref 31.4–35)
MCV RBC AUTO: 81.3 FL (ref 79.6–97.8)
PHOSPHATE SERPL-MCNC: 2.8 MG/DL (ref 2.3–3.7)
PLATELET # BLD AUTO: 220 K/UL (ref 150–450)
PMV BLD AUTO: 9.9 FL (ref 10.8–14.1)
POTASSIUM SERPL-SCNC: 3.5 MMOL/L (ref 3.5–5.1)
RBC # BLD AUTO: 4.23 M/UL (ref 4.23–5.67)
SODIUM SERPL-SCNC: 145 MMOL/L (ref 136–145)
WBC # BLD AUTO: 9.6 K/UL (ref 4.3–11.1)

## 2017-01-22 PROCEDURE — 36415 COLL VENOUS BLD VENIPUNCTURE: CPT | Performed by: HOSPITALIST

## 2017-01-22 PROCEDURE — 80048 BASIC METABOLIC PNL TOTAL CA: CPT | Performed by: HOSPITALIST

## 2017-01-22 PROCEDURE — 74011250637 HC RX REV CODE- 250/637: Performed by: INTERNAL MEDICINE

## 2017-01-22 PROCEDURE — 74011250637 HC RX REV CODE- 250/637: Performed by: PSYCHIATRY & NEUROLOGY

## 2017-01-22 PROCEDURE — 65270000029 HC RM PRIVATE

## 2017-01-22 PROCEDURE — 85027 COMPLETE CBC AUTOMATED: CPT | Performed by: HOSPITALIST

## 2017-01-22 PROCEDURE — 74011000250 HC RX REV CODE- 250: Performed by: INTERNAL MEDICINE

## 2017-01-22 PROCEDURE — 74011250636 HC RX REV CODE- 250/636: Performed by: INTERNAL MEDICINE

## 2017-01-22 PROCEDURE — 83735 ASSAY OF MAGNESIUM: CPT | Performed by: HOSPITALIST

## 2017-01-22 PROCEDURE — 74011250637 HC RX REV CODE- 250/637: Performed by: HOSPITALIST

## 2017-01-22 PROCEDURE — 74011250636 HC RX REV CODE- 250/636: Performed by: HOSPITALIST

## 2017-01-22 PROCEDURE — 84100 ASSAY OF PHOSPHORUS: CPT | Performed by: HOSPITALIST

## 2017-01-22 RX ORDER — QUETIAPINE FUMARATE 100 MG/1
100 TABLET, FILM COATED ORAL
Status: DISCONTINUED | OUTPATIENT
Start: 2017-01-22 | End: 2017-01-23

## 2017-01-22 RX ORDER — DIVALPROEX SODIUM 500 MG/1
1000 TABLET, EXTENDED RELEASE ORAL
Status: DISCONTINUED | OUTPATIENT
Start: 2017-01-22 | End: 2017-01-27 | Stop reason: HOSPADM

## 2017-01-22 RX ADMIN — LABETALOL HCL 200 MG: 200 TABLET, FILM COATED ORAL at 09:49

## 2017-01-22 RX ADMIN — LEVOTHYROXINE SODIUM 137 MCG: 112 TABLET ORAL at 06:55

## 2017-01-22 RX ADMIN — Medication 10 ML: at 20:54

## 2017-01-22 RX ADMIN — WATER 10 MG: 1 INJECTION INTRAMUSCULAR; INTRAVENOUS; SUBCUTANEOUS at 00:30

## 2017-01-22 RX ADMIN — AMOXICILLIN AND CLAVULANATE POTASSIUM 1 TABLET: 875; 125 TABLET, FILM COATED ORAL at 20:53

## 2017-01-22 RX ADMIN — ATORVASTATIN CALCIUM 40 MG: 40 TABLET, FILM COATED ORAL at 09:49

## 2017-01-22 RX ADMIN — DIVALPROEX SODIUM 1000 MG: 500 TABLET, FILM COATED, EXTENDED RELEASE ORAL at 20:52

## 2017-01-22 RX ADMIN — HEPARIN SODIUM 5000 UNITS: 5000 INJECTION, SOLUTION INTRAVENOUS; SUBCUTANEOUS at 17:26

## 2017-01-22 RX ADMIN — MEMANTINE HYDROCHLORIDE 5 MG: 5 TABLET ORAL at 07:29

## 2017-01-22 RX ADMIN — CLOPIDOGREL BISULFATE 75 MG: 75 TABLET ORAL at 09:48

## 2017-01-22 RX ADMIN — TAMSULOSIN HYDROCHLORIDE 0.4 MG: 0.4 CAPSULE ORAL at 09:49

## 2017-01-22 RX ADMIN — ESCITALOPRAM OXALATE 10 MG: 10 TABLET ORAL at 09:48

## 2017-01-22 RX ADMIN — VITAMIN D, TAB 1000IU (100/BT) 1000 UNITS: 25 TAB at 09:48

## 2017-01-22 RX ADMIN — HEPARIN SODIUM 5000 UNITS: 5000 INJECTION, SOLUTION INTRAVENOUS; SUBCUTANEOUS at 01:12

## 2017-01-22 RX ADMIN — LABETALOL HCL 200 MG: 200 TABLET, FILM COATED ORAL at 21:00

## 2017-01-22 RX ADMIN — ASPIRIN 81 MG: 81 TABLET, COATED ORAL at 09:49

## 2017-01-22 RX ADMIN — Medication 10 ML: at 17:26

## 2017-01-22 RX ADMIN — AMOXICILLIN AND CLAVULANATE POTASSIUM 1 TABLET: 875; 125 TABLET, FILM COATED ORAL at 09:48

## 2017-01-22 RX ADMIN — WATER 10 MG: 1 INJECTION INTRAMUSCULAR; INTRAVENOUS; SUBCUTANEOUS at 23:20

## 2017-01-22 RX ADMIN — FINASTERIDE 5 MG: 5 TABLET, FILM COATED ORAL at 09:48

## 2017-01-22 RX ADMIN — Medication 10 ML: at 06:56

## 2017-01-22 RX ADMIN — QUETIAPINE FUMARATE 100 MG: 100 TABLET, FILM COATED ORAL at 20:53

## 2017-01-22 RX ADMIN — HEPARIN SODIUM 5000 UNITS: 5000 INJECTION, SOLUTION INTRAVENOUS; SUBCUTANEOUS at 09:49

## 2017-01-22 RX ADMIN — AMLODIPINE BESYLATE 10 MG: 10 TABLET ORAL at 09:48
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
## 2017-01-22 NOTE — PROGRESS NOTES
Dr. Rizo Found notified of pt with severely increased agitation and combativeness. Telephone order given for 10 mg Geodon IM x1, and 24hr Non-Behavioral bilateral soft wrist restraints.

## 2017-01-22 NOTE — PROGRESS NOTES
Sitting in bedside chair surrounded by family  Soft bilateral wrist restraints removed  Patient is pleasant to this nurse and family  Patient greeted me with handshake without combativeness

## 2017-01-22 NOTE — PROGRESS NOTES
Pt becoming severely aggressive, physically combative, and verbally abusive. Placed in soft bilateral wrist restraints.   Paged hospitalist.

## 2017-01-22 NOTE — PROGRESS NOTES
[facility]    Events of last night noted in chart. Will increase seroquel to reduce reliance on prn doses of geodon. It's OK to use them if necessary, as the neuroleptics are often required to break a cycle of ambrocio. The depacon is better as a mood stabilizer once the acute crisis has passed. Memantine can be increased at weekly intervals (5 mg each week to goal of 10 mg bid), and exelon patch can be increased to 9.5 mg/d in one month. No further recommendations. This is all really outpatient management from here. Would defer to Dr. Danay Machado for further instructions. Will sign off.     Marianna Rendon MD

## 2017-01-22 NOTE — PROGRESS NOTES
Pt remains very restless but with less agitation. Family x2 remains at bedside to keep pt in bed. Pt remains in soft bilateral wrist restraints.

## 2017-01-22 NOTE — PROGRESS NOTES
Resting in bedside chair, family at bedside  Respirations even and unlabored  No agitation noted   No combativeness noted  Family at bedside

## 2017-01-22 NOTE — PROGRESS NOTES
Verbal bedside report given to oncoming nurse Ashley Lerner. Patient's situation, background, assessment and recommendations provided. Opportunity for questions provided. No s/s of pain noted. No distress noted. Oncoming RN assumed care of patient.

## 2017-01-22 NOTE — PROGRESS NOTES
Hospitalist Progress Note    Subjective:   Daily Progress Note: 1/22/2017 9:58 AM    Mr. Mer Cole is a 67year old white male, with baseline dementia, who presented 1/17 for acute encephalopathy believed to possibly be ambrocio due to carbidopa medications. Neurology following along. Carbidopa, benzos/narcotics/haldol stopped and IV depacon given by neurology. Still very agitated and manic appearing during night last night requiring restraints again. Yesterday he recognized his daughter in law and daughter, knew his birthday, is following most commands. Today he is agitated and trying to kick me and saying mean things to me as I try to examine him. Family has noticed progressive decline over the past six months and neurology concerned for possible Lewy Body Dementia. Had a long discussion with multiple family members this morning that although Dr. Patrice Gutierrez is starting exelon patch, namenda and seroquel while continuing qhs depacon, that his mental status is likely to not improve to the father they once knew and that LBD is progressive.       Current Facility-Administered Medications   Medication Dose Route Frequency    QUEtiapine (SEROquel) tablet 25 mg  25 mg Oral QHS    memantine (NAMENDA) tablet 5 mg  5 mg Oral DAILY    rivastigmine (EXELON) 4.6 mg/24 hr patch 1 Patch  1 Patch TransDERmal DAILY    magic mouthwash (TIFFANY) oral suspension 10 mL  10 mL Swish and Spit Q4H PRN    sodium chloride (OCEAN) 0.65 % nasal spray 2 Spray  2 Spray Both Nostrils PRN    lip protectant (BLISTEX) ointment   Topical PRN    labetalol (NORMODYNE) tablet 200 mg  200 mg Oral Q12H    amoxicillin-clavulanate (AUGMENTIN) 875-125 mg per tablet 1 Tab  1 Tab Oral Q12H    aspirin delayed-release tablet 81 mg  81 mg Oral DAILY    atorvastatin (LIPITOR) tablet 40 mg  40 mg Oral DAILY    cholecalciferol (VITAMIN D3) tablet 1,000 Units  1,000 Units Oral DAILY    clopidogrel (PLAVIX) tablet 75 mg  75 mg Oral DAILY    diphenoxylate-atropine (LOMOTIL) tablet 2 Tab  2 Tab Oral Q6H PRN    escitalopram oxalate (LEXAPRO) tablet 10 mg  10 mg Oral DAILY    finasteride (PROSCAR) tablet 5 mg  5 mg Oral DAILY    levothyroxine (SYNTHROID) tablet 137 mcg  137 mcg Oral ACB    nitroglycerin (NITROSTAT) tablet 0.4 mg  0.4 mg SubLINGual Q5MIN PRN    tamsulosin (FLOMAX) capsule 0.4 mg  0.4 mg Oral DAILY    sodium chloride (NS) flush 5-10 mL  5-10 mL IntraVENous Q8H    sodium chloride (NS) flush 5-10 mL  5-10 mL IntraVENous PRN    naloxone (NARCAN) injection 0.4 mg  0.4 mg IntraVENous PRN    diphenhydrAMINE (BENADRYL) injection 12.5 mg  12.5 mg IntraVENous Q4H PRN    ondansetron (ZOFRAN ODT) tablet 4 mg  4 mg Oral Q4H PRN    bisacodyl (DULCOLAX) suppository 10 mg  10 mg Rectal DAILY PRN    heparin (porcine) injection 5,000 Units  5,000 Units SubCUTAneous Q8H    hydrALAZINE (APRESOLINE) 20 mg/mL injection 20 mg  20 mg IntraVENous Q6H PRN    acetaminophen (TYLENOL) tablet 650 mg  650 mg Oral Q6H PRN    amLODIPine (NORVASC) tablet 10 mg  10 mg Oral DAILY        Review of Systems  Unable to obtain due to his mental status    Objective:     Visit Vitals    BP (!) 148/95 (BP 1 Location: Right arm, BP Patient Position: At rest)    Pulse 64    Temp 98 °F (36.7 °C)    Resp 20    Ht 6' 3.5\" (1.918 m)    Wt 96.2 kg (212 lb)    SpO2 95%    BMI 26.15 kg/m2      O2 Device: Room air    Temp (24hrs), Av °F (36.7 °C), Min:96.8 °F (36 °C), Max:98.9 °F (37.2 °C)          1901 -  0700  In: 600 [P.O.:600]  Out: -     General: awake, alert, disoriented and agitated  Eyes; non icteric, EOMI  Neck; supple  CV: RRR  Abd; soft, non tender, non distended  Neuro: knows his name, is thrashing about, not following commands, moving all extremities    Additional comments: all labs, notes and studies from the past 24 hours have been personally reviewed today by me.      Data Review    No results found for this or any previous visit (from the past 24 hour(s)). 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 No haldol, narcotics, benzos. qhs depacon- can convert to PO once he is taking it.  exelon patch, namenda, seroquel added. Will need rehab once out of restraints for 24 hours.      Care Plan discussed with:     Signed By: Brook Phelps MD     January 22, 2017

## 2017-01-22 NOTE — PROGRESS NOTES
Remain in bilateral soft wrist restraints  Patient grabbing at the rails and shaking them  Family at bedside  Patient is alert to name  Respirations are even and unlabored, no c/o pain and no s/sx of distress

## 2017-01-23 PROBLEM — F03.918 DEMENTIA WITH BEHAVIORAL DISTURBANCE: Status: ACTIVE | Noted: 2017-01-23

## 2017-01-23 PROBLEM — G93.40 ACUTE ENCEPHALOPATHY: Status: ACTIVE | Noted: 2017-01-23

## 2017-01-23 PROBLEM — N17.9 ACUTE RENAL FAILURE (ARF) (HCC): Status: RESOLVED | Noted: 2017-01-17 | Resolved: 2017-01-23

## 2017-01-23 PROCEDURE — 74011250637 HC RX REV CODE- 250/637: Performed by: HOSPITALIST

## 2017-01-23 PROCEDURE — 74011250637 HC RX REV CODE- 250/637: Performed by: INTERNAL MEDICINE

## 2017-01-23 PROCEDURE — 97530 THERAPEUTIC ACTIVITIES: CPT

## 2017-01-23 PROCEDURE — 65270000029 HC RM PRIVATE

## 2017-01-23 PROCEDURE — 74011250636 HC RX REV CODE- 250/636: Performed by: HOSPITALIST

## 2017-01-23 PROCEDURE — 94660 CPAP INITIATION&MGMT: CPT

## 2017-01-23 PROCEDURE — 74011250637 HC RX REV CODE- 250/637: Performed by: PSYCHIATRY & NEUROLOGY

## 2017-01-23 RX ORDER — QUETIAPINE FUMARATE 25 MG/1
50 TABLET, FILM COATED ORAL
Status: DISCONTINUED | OUTPATIENT
Start: 2017-01-23 | End: 2017-01-25

## 2017-01-23 RX ADMIN — HEPARIN SODIUM 5000 UNITS: 5000 INJECTION, SOLUTION INTRAVENOUS; SUBCUTANEOUS at 17:28

## 2017-01-23 RX ADMIN — TAMSULOSIN HYDROCHLORIDE 0.4 MG: 0.4 CAPSULE ORAL at 08:18

## 2017-01-23 RX ADMIN — LEVOTHYROXINE SODIUM 137 MCG: 112 TABLET ORAL at 05:52

## 2017-01-23 RX ADMIN — AMLODIPINE BESYLATE 10 MG: 10 TABLET ORAL at 08:19

## 2017-01-23 RX ADMIN — QUETIAPINE FUMARATE 50 MG: 25 TABLET, FILM COATED ORAL at 21:58

## 2017-01-23 RX ADMIN — MEMANTINE HYDROCHLORIDE 5 MG: 5 TABLET ORAL at 08:18

## 2017-01-23 RX ADMIN — LABETALOL HCL 200 MG: 200 TABLET, FILM COATED ORAL at 08:19

## 2017-01-23 RX ADMIN — CLOPIDOGREL BISULFATE 75 MG: 75 TABLET ORAL at 08:19

## 2017-01-23 RX ADMIN — AMOXICILLIN AND CLAVULANATE POTASSIUM 1 TABLET: 875; 125 TABLET, FILM COATED ORAL at 08:19

## 2017-01-23 RX ADMIN — VITAMIN D, TAB 1000IU (100/BT) 1000 UNITS: 25 TAB at 08:19

## 2017-01-23 RX ADMIN — ASPIRIN 81 MG: 81 TABLET, COATED ORAL at 08:18

## 2017-01-23 RX ADMIN — HEPARIN SODIUM 5000 UNITS: 5000 INJECTION, SOLUTION INTRAVENOUS; SUBCUTANEOUS at 02:11

## 2017-01-23 RX ADMIN — Medication 5 ML: at 22:01

## 2017-01-23 RX ADMIN — Medication 5 ML: at 05:52

## 2017-01-23 RX ADMIN — FINASTERIDE 5 MG: 5 TABLET, FILM COATED ORAL at 08:19

## 2017-01-23 RX ADMIN — HEPARIN SODIUM 5000 UNITS: 5000 INJECTION, SOLUTION INTRAVENOUS; SUBCUTANEOUS at 08:27

## 2017-01-23 RX ADMIN — AMOXICILLIN AND CLAVULANATE POTASSIUM 1 TABLET: 875; 125 TABLET, FILM COATED ORAL at 21:59

## 2017-01-23 RX ADMIN — DIVALPROEX SODIUM 1000 MG: 500 TABLET, FILM COATED, EXTENDED RELEASE ORAL at 22:00

## 2017-01-23 RX ADMIN — ATORVASTATIN CALCIUM 40 MG: 40 TABLET, FILM COATED ORAL at 08:19

## 2017-01-23 RX ADMIN — Medication 5 ML: at 17:38

## 2017-01-23 RX ADMIN — ESCITALOPRAM OXALATE 10 MG: 10 TABLET ORAL at 08:19

## 2017-01-23 RX ADMIN — LABETALOL HCL 200 MG: 200 TABLET, FILM COATED ORAL at 22:04

## 2017-01-23 NOTE — PROGRESS NOTES
Pt assisted back to bed by family. Pt alert but very confused and disoriented. Oriented to person only, knows family at present. Assessment completed, see doc flowsheet.

## 2017-01-23 NOTE — PROGRESS NOTES
PT Note:  PT's chart reviewed and treatment attempted. Per RN, pt has been up all night and advised PT to hold until afternoon as pt is resting now. PT will re-attempt as schedule and pt status allow.   Thanks,  Grace Valdovinos, PT, DPT

## 2017-01-23 NOTE — PROGRESS NOTES
Pt attempting to kick family and staff. Pt attempting to roll legs up over head in the bed also. Soft ankle restraints placed loosely to prevent pt from rolling up over head.

## 2017-01-23 NOTE — PROGRESS NOTES
Long discussion had with daughter, son, and daughter-in-law. Discussed severity of delirium. Has been sleeping all day today and hopefully, this is just due to his lack of sleep and the new medications catching up to him. Neurology concerned with Lewy Body Dementia. Discussed typical downhill course of this. Verified his code status and his family all agreed that he would not want CPR/intubation in a code situation. Will change him to DNR. They are also concerned that he has had a slight cough since being here and has complained of swallowing difficulty. Will get CXR in AM and speech therapy consult in the morning. Due to his somnolence, will decrease seroquel to 50 mg qHS.

## 2017-01-23 NOTE — PROGRESS NOTES
Hospitalist Progress Note    2017  Admit Date: 2017 10:51 AM   NAME: Deshawn Hammond   :  1940   MRN:  [id number]   Attending: Arik Alvarez MD  PCP:  Marshal William MD    SUBJECTIVE:   As previously documented:  'Mr. David Álvarez is a 67year old white male, with baseline dementia, who presented  for acute encephalopathy believed to possibly be ambrocio due to carbidopa medications. Neurology following along. Carbidopa, benzos/narcotics/haldol stopped and IV depacon given by neurology. Still very agitated and manic appearing during night last night requiring restraints again. Yesterday he recognized his daughter in law and daughter, knew his birthday, is following most commands. Today he is agitated and trying to kick me and saying mean things to me as I try to examine him. Family has noticed progressive decline over the past six months and neurology concerned for possible Lewy Body Dementia. Had a long discussion with multiple family members this morning that although Dr. Mark Webber is starting exelon patch, namenda and seroquel while continuing [facility] depacon, that his mental status is likely to not improve to the father they once knew and that LBD is progressive.'    17- seen with friend, Maco Yin, at bedside. He is currently sleeping with CPAP in place. According to Maco Yin, he had a very rough night and did not sleep. He fell asleep ~9:30 AM this morning. According to his nurse, he usually has improved mental status in afternoon, then quickly decompensates in the evenings. Discussed with Maco Yin and Vero Franco RN the need to awaken him in 30 minutes to an hour so we can try to stop the day/night confusion. Isaac Bearden states his daughter is concerned about conjunctival redness. Will order some artificial tears and check this afternoon.         Review of Systems negative with exception of pertinent positives noted above  PHYSICAL EXAM     Visit Vitals    BP (!) 123/103    Pulse 88    Temp 98.8 °F (37.1 °C)    Resp 20    Ht 6' 3.5\" (1.918 m)    Wt 96.2 kg (212 lb)    SpO2 97%    BMI 26.15 kg/m2      Temp (24hrs), Av.1 °F (36.7 °C), Min:97.8 °F (36.6 °C), Max:98.8 °F (37.1 °C)    Oxygen Therapy  O2 Sat (%): 97 % (17 0809)  Pulse via Oximetry: 87 beats per minute (17 0022)  O2 Device: Room air (17 0307)    Intake/Output Summary (Last 24 hours) at 17 1117  Last data filed at 17 0853   Gross per 24 hour   Intake              780 ml   Output                0 ml   Net              780 ml      General: No acute distress, napping with CPAP in place   Lungs:  CTA Bilaterally. Heart:  Regular rate and rhythm,  No murmur, rub, or gallop  Abdomen: Soft, Non distended, Non tender, Positive bowel sounds  Extremities: No cyanosis, clubbing or edema  Neurologic:  No focal deficits    ASSESSMENT      Active Hospital Problems    Diagnosis Date Noted    Acute renal failure superimposed on stage 3 chronic kidney disease ([facility] 2017    Acute renal failure (ARF) ([facility] [facility] 2017    Parkinson disease ([facility] [facility] 2017    Memory deficits 2017    Mononeuropathy of right posterior interosseous nerve 2017     Plan:  · Continue meds as ordered. Will add on prn geodon for night use if agitated as he did not sleep at all overnight and was very agitated. · Awaken him ~12:30 this afternoon and try to keep him awake and orient him during the day to help reduce further sundowning. · Appreciate neurology recommendations of exelon patch, memantine, seroquel, and depakote. · Still requiring restraints. Hopefully over the next day or 2 may be able to remain off of them. DVT Prophylaxis: Heparin    Signed By: Rosanne Stewart.  Elma Baires MD     2017

## 2017-01-23 NOTE — PROGRESS NOTES
Pt calm at present but mumbling with his eyes closed. Daughter at the bedside refused pt VSS now. Cpap in place. No distress.

## 2017-01-23 NOTE — PROGRESS NOTES
Bedside report received from night nurse Cecil Maloney. Assessment done as noted  Respiration even and unlabored 20/min; denies pain or nausea at present. Has been combative through the night. Remains restless and combative at present. Pulling on restraints in attempt to remove them. Daughter at bedside. Repositioned as much as pt would allow. Restraints secured. Door open. Will continue to monitor.

## 2017-01-23 NOTE — PROGRESS NOTES
Pt now aggitated, combative and confused. Combative with daughter also. Pt trying to kick staff and family and pulling at line and CPAP. Bilat soft wrist restraints replaced. Daughter at the bedside and also requested restraints be replaced. Dr. Cele Covarrubias paged.

## 2017-01-23 NOTE — PROGRESS NOTES
Bedside report given to Chloé Gonzalez RN. Family remains at the bedside. Restraints remain in place. Pt alert, confused and restless but no distress.
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
## 2017-01-24 ENCOUNTER — APPOINTMENT (OUTPATIENT)
Dept: GENERAL RADIOLOGY | Age: 77
DRG: 056 | End: 2017-01-24
Attending: INTERNAL MEDICINE
Payer: MEDICARE

## 2017-01-24 PROCEDURE — 74011250637 HC RX REV CODE- 250/637: Performed by: PSYCHIATRY & NEUROLOGY

## 2017-01-24 PROCEDURE — 74011250636 HC RX REV CODE- 250/636: Performed by: HOSPITALIST

## 2017-01-24 PROCEDURE — 71010 XR CHEST PORT: CPT

## 2017-01-24 PROCEDURE — 74011250637 HC RX REV CODE- 250/637: Performed by: HOSPITALIST

## 2017-01-24 PROCEDURE — 97530 THERAPEUTIC ACTIVITIES: CPT

## 2017-01-24 PROCEDURE — 65270000029 HC RM PRIVATE

## 2017-01-24 PROCEDURE — 74011250637 HC RX REV CODE- 250/637: Performed by: INTERNAL MEDICINE

## 2017-01-24 PROCEDURE — 92610 EVALUATE SWALLOWING FUNCTION: CPT

## 2017-01-24 PROCEDURE — 97116 GAIT TRAINING THERAPY: CPT

## 2017-01-24 RX ADMIN — HEPARIN SODIUM 5000 UNITS: 5000 INJECTION, SOLUTION INTRAVENOUS; SUBCUTANEOUS at 17:01

## 2017-01-24 RX ADMIN — ASPIRIN 81 MG: 81 TABLET, COATED ORAL at 09:29

## 2017-01-24 RX ADMIN — Medication 5 ML: at 21:48

## 2017-01-24 RX ADMIN — Medication 5 ML: at 05:20

## 2017-01-24 RX ADMIN — TAMSULOSIN HYDROCHLORIDE 0.4 MG: 0.4 CAPSULE ORAL at 09:29

## 2017-01-24 RX ADMIN — DIVALPROEX SODIUM 1000 MG: 500 TABLET, FILM COATED, EXTENDED RELEASE ORAL at 21:47

## 2017-01-24 RX ADMIN — MEMANTINE HYDROCHLORIDE 5 MG: 5 TABLET ORAL at 09:30

## 2017-01-24 RX ADMIN — HEPARIN SODIUM 5000 UNITS: 5000 INJECTION, SOLUTION INTRAVENOUS; SUBCUTANEOUS at 00:14

## 2017-01-24 RX ADMIN — ATORVASTATIN CALCIUM 40 MG: 40 TABLET, FILM COATED ORAL at 09:30

## 2017-01-24 RX ADMIN — LABETALOL HCL 200 MG: 200 TABLET, FILM COATED ORAL at 09:30

## 2017-01-24 RX ADMIN — LEVOTHYROXINE SODIUM 137 MCG: 112 TABLET ORAL at 05:13

## 2017-01-24 RX ADMIN — Medication 5 ML: at 17:04

## 2017-01-24 RX ADMIN — AMOXICILLIN AND CLAVULANATE POTASSIUM 1 TABLET: 875; 125 TABLET, FILM COATED ORAL at 09:30

## 2017-01-24 RX ADMIN — AMOXICILLIN AND CLAVULANATE POTASSIUM 1 TABLET: 875; 125 TABLET, FILM COATED ORAL at 21:47

## 2017-01-24 RX ADMIN — AMLODIPINE BESYLATE 10 MG: 10 TABLET ORAL at 09:30

## 2017-01-24 RX ADMIN — CLOPIDOGREL BISULFATE 75 MG: 75 TABLET ORAL at 09:29

## 2017-01-24 RX ADMIN — VITAMIN D, TAB 1000IU (100/BT) 1000 UNITS: 25 TAB at 09:29

## 2017-01-24 RX ADMIN — LABETALOL HCL 200 MG: 200 TABLET, FILM COATED ORAL at 21:47

## 2017-01-24 RX ADMIN — QUETIAPINE FUMARATE 50 MG: 25 TABLET, FILM COATED ORAL at 21:47

## 2017-01-24 RX ADMIN — FINASTERIDE 5 MG: 5 TABLET, FILM COATED ORAL at 09:30

## 2017-01-24 RX ADMIN — ESCITALOPRAM OXALATE 10 MG: 10 TABLET ORAL at 09:29

## 2017-01-24 RX ADMIN — HEPARIN SODIUM 5000 UNITS: 5000 INJECTION, SOLUTION INTRAVENOUS; SUBCUTANEOUS at 09:32

## 2017-01-24 NOTE — PROGRESS NOTES
Shift assessment. Pt resting comfortably in bed with family at bedside. Alert and disoriented x4. Respirations even and unlabored, no acute distress noted. Assessment completed as documented. Restraints to BLE and BUE, no distress noted. Pt offers no complaints at this time. Call light in reach, pt encouraged to call for assistance. Will continue to monitor.

## 2017-01-24 NOTE — PROGRESS NOTES
Verbal bedside report given to oncoming nurse Shraddha. Patient's situation, background, assessment and recommendations provided. Opportunity for questions provided. No s/s of pain noted. No distress noted. Oncoming RN assumed care of patient.

## 2017-01-24 NOTE — PROGRESS NOTES
OT note:  Pt was in supine upon arrival. However, therapist had seen pt up in the hallway with his family several minutes prior to above attempt. Pt's family stated pt had actually already walked twice and sat up for a long time after lunch and declined that pt have further therapy. Will re-attempt at a later date/time.   Quinn Alford

## 2017-01-24 NOTE — PROGRESS NOTES
Problem: Dysphagia (Adult)  Goal: *Acute Goals and Plan of Care (Insert Text)  STG: Patient will tolerate mechanical soft diet and thin liquids without overt signs or symptoms of aspiration 100% of the time. STG: Patient will participate in Modified Barium Swallow study x1. LTG: Patient will tolerate least restrictive diet consistency without respiratory compromise by discharge. Speech language pathology: bedside swallow note: Initial Assessment    NAME/AGE/GENDER: Lucinda Rangel is a 67 y.o. male  DATE: 1/24/2017  PRIMARY DIAGNOSIS: Acute renal failure (ARF) ([facility] [address]       ICD-10: Treatment Diagnosis: dysphagia (R13.10)  INTERDISCIPLINARY COLLABORATION: Registered Nurse  PRECAUTIONS/ALLERGIES: Tramadol; Hydrocodone-acetaminophen; and Sulfa (sulfonamide antibiotics) ASSESSMENT:Based on the objective data described below, Mr. Alena Arnett presents with initial trials of thin liquids by cup and straw, puree, mixed consistencies and cracker without overt signs or symptoms of aspiration and with clear voicing after swallow. Patient with increased oral transit time and munching mastication for solids, but eventually able to clear oral cavity completely. At conclusion of study, patient with throat clear. Patient/family report patient has complained of being unable to swallow. He is not clear whether he feels he's choking or has globus sensation, but family reports he frequently coughs during meals. Further investigation of pharyngeal phase of swallow is warranted via Modified Barium Swallow (MBS) study. Will schedule for tomorrow AM. In the meantime, recommend mechanical soft diet and thin liquids. Meds whole in puree. Patient should be upright for all PO. Patient will benefit from skilled intervention to address the below impairments. ?????? ? ? This section established at most recent assessment??????????  PROBLEM LIST (Impairments causing functional limitations):  1. dysphagia  REHABILITATION POTENTIAL FOR STATED GOALS: Good  PLAN OF CARE:   Patient will benefit from skilled intervention to address the following impairments. RECOMMENDATIONS AND PLANNED INTERVENTIONS (Benefits and precautions of therapy have been discussed with the patient.):  · PO:  Mechanical soft  · Liquids:  regular thin  MEDICATIONS:  · Whole in puree  COMPENSATORY STRATEGIES/MODIFICATIONS INCLUDING:  · Upright for all PO  OTHER RECOMMENDATIONS (including follow up treatment recommendations):   · MBS  RECOMMENDED DIET MODIFICATIONS DISCUSSED WITH:  · Nursing  · Family  · Patient  FREQUENCY/DURATION: Continue to follow patient 3 times a week until goals met. RECOMMENDED REHABILITATION/EQUIPMENT: (at time of discharge pending progress):   Continue Skilled Therapy. SUBJECTIVE:   Patient pleasant and cooperative. He is hard of hearing. He has a hearing aid in, but I do not think it is functioning. History of Present Injury/Illness: Mr. Ketan Ren  has no past medical history on file. He also  has a past surgical history that includes heent; orthopaedic; orthopaedic (Left); cardiac surg procedure unlist; and thyroidectomy. Present Symptoms: cough with meals   Pain Intensity 1: 0  Pain Location 1: Coccyx  Pain Intervention(s) 1: Rest  Current Medications:   No current facility-administered medications on file prior to encounter. Current Outpatient Prescriptions on File Prior to Encounter   Medication Sig Dispense Refill    amLODIPine (NORVASC) 10 mg tablet daily.  aspirin delayed-release 81 mg tablet Take 81 mg by mouth.  atorvastatin (LIPITOR) 40 mg tablet daily.  clopidogrel (PLAVIX) 75 mg tab daily.  escitalopram oxalate (LEXAPRO) 10 mg tablet daily.  finasteride (PROSCAR) 5 mg tablet daily.  omeprazole (PRILOSEC) 20 mg capsule Take 20 mg by mouth daily.  levothyroxine (SYNTHROID) 137 mcg tablet Take  by mouth Daily (before breakfast).  lisinopril (PRINIVIL, ZESTRIL) 40 mg tablet Take 40 mg by mouth daily.       tamsulosin (FLOMAX) 0.4 mg capsule Take 0.4 mg by mouth daily.  carbidopa-levodopa (SINEMET)  mg per tablet Take 2 Tabs by mouth three (3) times daily. 540 Tab 0    cholecalciferol (VITAMIN D3) 1,000 unit tablet Take 1,000 Units by mouth.  diphenoxylate-atropine (LOMOTIL) 2.5-0.025 mg per tablet Take 2 Tabs by mouth every four (4) hours.  nitroglycerin (NITROSTAT) 0.4 mg SL tablet by SubLINGual route every five (5) minutes as needed for Chest Pain.  temazepam (RESTORIL) 30 mg capsule Take  by mouth nightly as needed for Sleep. Current Dietary Status:  Regular textures, thin liquids      History of reflux:  YES     Social History/Home Situation:    Home Environment: Private residence  One/Two Story Residence: Split level  Living Alone: Yes (staying with daughter recently)  Support Systems: Child(margie)  Patient Expects to be Discharged to[de-identified] Unknown  Current DME Used/Available at Home: None  OBJECTIVE:   Respiratory Status:  Room air     CXR Results:no acute findings    Cognitive and Communication Status:  Neurologic State: Alert;Confused  Orientation Level: Oriented to person  Cognition: Follows commands  Perception: Visual  Perseveration: No perseveration noted  Safety/Judgement: Fall prevention    BEDSIDE SWALLOW EVALUATION  Oral Assessment:  Oral Assessment  Labial: No impairment  Dentition: Partials (comment)  Oral Hygiene: adequate  Lingual: No impairment  Velum: No impairment  P.O. Trials:  Patient Position: Upright in bed    The patient was given the following:   Consistency Presented: Thin liquid; Solid;Puree;Mixed consistency  How Presented: Self-fed/presented;SLP-fed/presented;Cup/sip;Cup/gulp; Spoon;Straw;Successive swallows    ORAL PHASE:  Bolus Acceptance: No impairment  Bolus Formation/Control: Impaired  Propulsion: Delayed (# of seconds)  Type of Impairment: Mastication  Oral Residue: None    PHARYNGEAL PHASE:  Initiation of Swallow: No impairment  Laryngeal Elevation: Functional  Aspiration Signs/Symptoms: Delayed cough/throat clear  Vocal Quality: No impairment           Pharyngeal Phase Characteristics: Foreign body sensation    OTHER OBSERVATIONS:  Rate/bite size: WNL   Endurance: WNL   Comments: throat clear at end of assessment     Tool Used: Dysphagia Outcome and Severity Scale (LETA)    Score Comments   Normal Diet  [] 7 With no strategies or extra time needed   Functional Swallow  [] 6 May have mild oral or pharyngeal delay       Mild Dysphagia    [x] 5 Which may require one diet consistency restricted (those who demonstrate penetration which is entirely cleared on MBS would be included)   Mild-Moderate Dysphagia  [] 4 With 1-2 diet consistencies restricted       Moderate Dysphagia  [] 3 With 2 or more diet consistencies restricted       Moderately Severe Dysphagia  [] 2 With partial PO strategies (trials with ST only)       Severe Dysphagia  [] 1 With inability to tolerate any PO safely          Score:  Initial: 5 Most Recent: X (Date: -- )   Interpretation of Tool: The Dysphagia Outcome and Severity Scale (LETA) is a simple, easy-to-use, 7-point scale developed to systematically rate the functional severity of dysphagia based on objective assessment and make recommendations for diet level, independence level, and type of nutrition. Score 7 6 5 4 3 2 1   Modifier CH CI CJ CK CL CM CN   ?  Swallowing:     - CURRENT STATUS: CJ - 20%-39% impaired, limited or restricted    - GOAL STATUS:  CI - 1%-19% impaired, limited or restricted    - D/C STATUS:  ---------------To be determined---------------  Payor: CARE IMPROVEMENT PLUS / Plan: [place] [employer] / Product Type: [employer] Medicare /     TREATMENT:    (In addition to Assessment/Re-Assessment sessions the following treatments were rendered)  Assessment/Reassessment only, no treatment provided today  MODALITIES:                                                                    ORAL MOTOR EXERCISES:                                                                                                                                                                      LARYNGEAL / PHARYNGEAL EXERCISES:                                                                                                                                     __________________________________________________________________________________________________  Safety:   After treatment position/precautions:  · RN notified  · Family at bedside  · Upright in Bed  Treatment Assessment:    Progression/Medical Necessity:   · Patient is expected to demonstrate progress in swallow timeliness, swallow function, diet tolerance and swallow safety to improve swallow safety, work toward diet advancement and decrease aspiration risk. Compliance with Program/Exercises: Will assess as treatment progresses. Reason for Continuation of Services/Other Comments:  · Patient continues to require skilled intervention due to recommendation for modified diet. Recommendations/Intent for next treatment session: \"Treatment next visit will focus on MBS\".     Total Treatment Duration:  Time In: 0900  Time Out: [address], [place], CCC-SLP

## 2017-01-24 NOTE — PROGRESS NOTES
Hospitalist Progress Note    2017  Admit Date: 2017 10:51 AM   NAME: Timmy Manning   :  1940   MRN:  [id number]   Attending: Nathan Marsh MD  PCP:  Chase Bailey MD    SUBJECTIVE:   As previously documented:  'Mr. Rafa Trammell is a 67year old white male, with baseline dementia, who presented  for acute encephalopathy believed to possibly be ambrocio due to carbidopa medications. Neurology following along. Carbidopa, benzos/narcotics/haldol stopped and IV depacon given by neurology. Still very agitated and manic appearing during night last night requiring restraints again. Yesterday he recognized his daughter in law and daughter, knew his birthday, is following most commands. Today he is agitated and trying to kick me and saying mean things to me as I try to examine him. Family has noticed progressive decline over the past six months and neurology concerned for possible Lewy Body Dementia. Had a long discussion with multiple family members this morning that although Dr. Yazmin Cyr is starting exelon patch, namenda and seroquel while continuing [facility] depacon, that his mental status is likely to not improve to the father they once knew and that LBD is progressive.'    17- seen with friend, Charles Barron, at bedside. He is currently sleeping with CPAP in place. According to Charles Barron, he had a very rough night and did not sleep. He fell asleep ~9:30 AM this morning. According to his nurse, he usually has improved mental status in afternoon, then quickly decompensates in the evenings. Discussed with Charles Barron and Rachel Gomez RN the need to awaken him in 30 minutes to an hour so we can try to stop the day/night confusion. Artie Sandifer states his daughter is concerned about conjunctival redness. Will order some artificial tears and check this afternoon. 17- seen with DIL at bedside. He was sleeping upon entering room, but easily awakened to voice. Slept well last PM until 5 AM this morning.   Stayed awake and much calmer from 5AM until about 2:30 and then napped. He ate a good breakfast and lunch. He is oriented to self, month, and setting; confused on current year. He has been out of restraints since early this morning. CXR obtained yesterday for cough is normal.  He denies complaints aside from being sleepy. Review of Systems negative with exception of pertinent positives noted above  PHYSICAL EXAM     Visit Vitals    /69    Pulse 65    Temp 97.9 °F (36.6 °C)    Resp 20    Ht 6' 3.5\" (1.918 m)    Wt 96.2 kg (212 lb)    SpO2 97%    BMI 26.15 kg/m2      Temp (24hrs), Av °F (36.7 °C), Min:97.1 °F (36.2 °C), Max:99.5 °F (37.5 °C)    Oxygen Therapy  O2 Sat (%): 97 % (17 1636)  Pulse via Oximetry: 87 beats per minute (17 0022)  O2 Device: Room air (17 0745)    Intake/Output Summary (Last 24 hours) at 17 1725  Last data filed at 17 0901   Gross per 24 hour   Intake              360 ml   Output                0 ml   Net              360 ml      General: No acute distress, napping, easily awakened; alert and oriented to self, place, and month  Lungs:  CTA Bilaterally. Heart:  Regular rate and rhythm,  + systolic murmur  Abdomen: Soft, Non distended, Non tender, Positive bowel sounds  Extremities: No cyanosis, clubbing or edema  Neurologic:  No focal deficits    ASSESSMENT      Active Hospital Problems    Diagnosis Date Noted    Acute encephalopathy 2017     In setting of underlying dementia. Symptoms are severe currently and he is not safe for discharge at this point due to rapid cycling delirium.  Dementia with behavioral disturbance 2017     Behavioral issues likely related to some medication changes prior to admission.       Acute renal failure superimposed on stage 3 chronic kidney disease ([address] 2017    Parkinson disease ([facility] [address] 2017    Memory deficits 2017    Mononeuropathy of right posterior interosseous nerve 2017    RBD (REM behavioral disorder) 01/03/2017     Plan:  · Acute encephalopathy/delirium- seems to be improving. Continue meds as ordered. Has not needed restraints today. · Discussed with DIL to try to let him eat supper and try to keep him awake and oriented until bedtime. · Appreciate neurology recommendations of exelon patch, memantine, seroquel, and depakote. · Dispo- likely to SNF for STR by Thursday if continuing to improve. DVT Prophylaxis: Heparin    Signed By: Iman Hamilton.  Ellen Craven MD     January 24, 2017

## 2017-01-24 NOTE — PROGRESS NOTES
Morning assessment completed. Patient resting in bed, alert and cooperative, demonstrating no signs of dyspnea or distress. Patient follows instruction without hesitation. Bilateral wrist restraints have been removed, with family members at the bedside; bilateral ankle restraints are on bed, not attached to patient. All restraints have been removed and discontinued due to patients' improved behavior. Incontinent briefs on, patient has a right prosthetic eye, intact. Will continue to monitor.

## 2017-01-24 NOTE — PROGRESS NOTES
Problem: Nutrition Deficit  Goal: *Optimize nutritional status  Nutrition  Reason for assessment: LOS Day 7  Assessment:   Diet order(s): Regular  Food/Nutrition Patient History:  Pt presents with h/o Parkinson's and dementia. Pt is confused and unable to provide any information. Daughter and granddaughter are both present and provided RD with information. Per daughter pt's appetite is great. He ate all his breakfast this morning plus an oatmeal cream pie and some doughnuts. Daughter states he has a sweet tooth. Daughter does report some weight loss, however he seems to be doing much better now. Anthropometrics:Height: 6' 3.5\" (191.8 cm),  Weight: 96.2 kg (212 lb), Weight Source: Bed, Body mass index is 26.15 kg/(m^2). BMI class of healthy weight for age >71. Macronutrient needs:  EER:  7011-0523 kcal /day (20-22 kcal/kg actual BW)  EPR:  72-90 grams protein/day (0.8-1 grams/kg IBW) (GFR 36)  Intake/Comparative Standards: Average intake for past 12 recorded meal(s): 75% (plus meals and snacks provided by family). This potentially meets ~100% of kcal and ~100% of protein needs (per daughter's reports of food items being brought in)     Nutrition Diagnosis: No acute nutrition diagnosis at this time. Intervention:  Meals and snacks: Continue current diet. Nutrition Supplement Therapy: No supplements at this time. Coordination of Nutrition Care: STEPHANIE Garcia.  [address], [address],  [phone]
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
## 2017-01-24 NOTE — PROGRESS NOTES
Reassessment completed. Pt resting comfortably in bed. Soft restraints BLE and BUE. Respirations even and unlabored. Will continue to monitor.

## 2017-01-25 ENCOUNTER — APPOINTMENT (OUTPATIENT)
Dept: GENERAL RADIOLOGY | Age: 77
DRG: 056 | End: 2017-01-25
Attending: INTERNAL MEDICINE
Payer: MEDICARE

## 2017-01-25 PROCEDURE — 74011250637 HC RX REV CODE- 250/637: Performed by: INTERNAL MEDICINE

## 2017-01-25 PROCEDURE — 74011250637 HC RX REV CODE- 250/637: Performed by: PSYCHIATRY & NEUROLOGY

## 2017-01-25 PROCEDURE — 74230 X-RAY XM SWLNG FUNCJ C+: CPT

## 2017-01-25 PROCEDURE — 74011250637 HC RX REV CODE- 250/637: Performed by: HOSPITALIST

## 2017-01-25 PROCEDURE — 65270000029 HC RM PRIVATE

## 2017-01-25 PROCEDURE — 74011250636 HC RX REV CODE- 250/636: Performed by: HOSPITALIST

## 2017-01-25 PROCEDURE — 74011000255 HC RX REV CODE- 255: Performed by: HOSPITALIST

## 2017-01-25 PROCEDURE — 97530 THERAPEUTIC ACTIVITIES: CPT

## 2017-01-25 PROCEDURE — 92611 MOTION FLUOROSCOPY/SWALLOW: CPT

## 2017-01-25 RX ORDER — QUETIAPINE FUMARATE 100 MG/1
100 TABLET, FILM COATED ORAL
Status: DISCONTINUED | OUTPATIENT
Start: 2017-01-25 | End: 2017-01-26

## 2017-01-25 RX ADMIN — ESCITALOPRAM OXALATE 10 MG: 10 TABLET ORAL at 10:46

## 2017-01-25 RX ADMIN — DIVALPROEX SODIUM 1000 MG: 500 TABLET, FILM COATED, EXTENDED RELEASE ORAL at 21:33

## 2017-01-25 RX ADMIN — LABETALOL HCL 200 MG: 200 TABLET, FILM COATED ORAL at 10:46

## 2017-01-25 RX ADMIN — AMOXICILLIN AND CLAVULANATE POTASSIUM 1 TABLET: 875; 125 TABLET, FILM COATED ORAL at 10:47

## 2017-01-25 RX ADMIN — BARIUM SULFATE 30 ML: 400 SUSPENSION ORAL at 10:07

## 2017-01-25 RX ADMIN — BARIUM SULFATE 15 ML: 400 PASTE ORAL at 10:14

## 2017-01-25 RX ADMIN — AMOXICILLIN AND CLAVULANATE POTASSIUM 1 TABLET: 875; 125 TABLET, FILM COATED ORAL at 21:33

## 2017-01-25 RX ADMIN — FINASTERIDE 5 MG: 5 TABLET, FILM COATED ORAL at 10:46

## 2017-01-25 RX ADMIN — LEVOTHYROXINE SODIUM 137 MCG: 112 TABLET ORAL at 05:45

## 2017-01-25 RX ADMIN — AMLODIPINE BESYLATE 10 MG: 10 TABLET ORAL at 10:46

## 2017-01-25 RX ADMIN — HEPARIN SODIUM 5000 UNITS: 5000 INJECTION, SOLUTION INTRAVENOUS; SUBCUTANEOUS at 17:21

## 2017-01-25 RX ADMIN — HEPARIN SODIUM 5000 UNITS: 5000 INJECTION, SOLUTION INTRAVENOUS; SUBCUTANEOUS at 10:47

## 2017-01-25 RX ADMIN — LABETALOL HCL 200 MG: 200 TABLET, FILM COATED ORAL at 21:41

## 2017-01-25 RX ADMIN — Medication 10 ML: at 21:33

## 2017-01-25 RX ADMIN — ASPIRIN 81 MG: 81 TABLET, COATED ORAL at 10:46

## 2017-01-25 RX ADMIN — Medication 10 ML: at 17:22

## 2017-01-25 RX ADMIN — BARIUM SULFATE 15 ML: 400 SUSPENSION ORAL at 10:14

## 2017-01-25 RX ADMIN — CLOPIDOGREL BISULFATE 75 MG: 75 TABLET ORAL at 10:46

## 2017-01-25 RX ADMIN — VITAMIN D, TAB 1000IU (100/BT) 1000 UNITS: 25 TAB at 10:46

## 2017-01-25 RX ADMIN — Medication 5 ML: at 05:27

## 2017-01-25 RX ADMIN — TAMSULOSIN HYDROCHLORIDE 0.4 MG: 0.4 CAPSULE ORAL at 10:46

## 2017-01-25 RX ADMIN — ATORVASTATIN CALCIUM 40 MG: 40 TABLET, FILM COATED ORAL at 10:46

## 2017-01-25 RX ADMIN — MEMANTINE HYDROCHLORIDE 5 MG: 5 TABLET ORAL at 10:46

## 2017-01-25 RX ADMIN — QUETIAPINE FUMARATE 100 MG: 100 TABLET, FILM COATED ORAL at 21:33

## 2017-01-25 RX ADMIN — BARIUM SULFATE 45 ML: 980 POWDER, FOR SUSPENSION ORAL at 10:15

## 2017-01-25 NOTE — PROGRESS NOTES
Patient has been out of restraints since 1/23. Updated clinical documents forwarded to [facility] for possible transfer tomorrow. Case Management will continue to follow for discharge planning.
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
## 2017-01-25 NOTE — PROGRESS NOTES
Problem: Dysphagia (Adult)  Goal: *Acute Goals and Plan of Care (Insert Text)  STG: Patient will tolerate mechanical soft diet and honey-thick liquids by spoon without overt signs or symptoms of aspiration 100% of the time. Goal edited 1/25/17  STG: Patient will participate in Modified Barium Swallow study x1. Goal met 1/25/17  STG: Patient will perform laryngeal strengthening exercises x10 each with 70% accuracy. Goal added 1/25/17    LTG: Patient will tolerate least restrictive diet consistency without respiratory compromise by discharge. Speech language pathology: modified barium swallow study: Initial Assessment    NAME/AGE/GENDER: Kendell Awan is a 67 y.o. male  DATE: 1/25/2017  PRIMARY DIAGNOSIS: Acute renal failure (ARF) ([facility] [address]       ICD-10: Treatment Diagnosis: Oropharyngeal dysphagia (R13.12)  INTERDISCIPLINARY COLLABORATION: Radiologist, Dr. Cailin Gil  PRECAUTIONS/ALLERGIES: Tramadol; Hydrocodone-acetaminophen; and Sulfa (sulfonamide antibiotics) ASSESSMENT/PLAN OF CARE:Based on the objective data described below, Mr. Starr Michelle presents with initial presentations of thin and nectar liquids by spoon without laryngeal penetration, but with increasing trials, laryngeal penetration that got progressively deeper. Residue after swallow of all consistencies in valleculae and pyriform sinuses that increased with additional boluses, regardless of consistency. Laryngeal penetration of thin liquids not cleared despite cue to clear throat and eventually sitting on vocal cords. Deep laryngeal penetration of nectar liquids did clear with cued throat clear. No laryngeal penetration or aspiration observed with honey-thick liquids by spoon, but with cup, patient impulsive and takes to large of a sip resulting in laryngeal penetration. No laryngeal penetration or aspiration of pudding or cracker. Moderately delayed swallow of mixed consistencies with vallecular pooling for 10+ seconds prior to initiation of swallow.  Moderate residue in valleculae, pyriform sinuses and lining pharyngeal wall after swallow of solids, cleared somewhat with chin tuck and dry swallow at conclusion of study. Recommend mechanical soft diet with no mixed consistencies. Thicken all liquids to honey and present by spoon to control bolus size. SLP to follow for laryngeal exercises and training in safe swallowing strategies. Patient may benefit from free water in isolation by spoon in between meals if oral cavity is completely clear. Results/recommendations called to floor. Spoke with Lesa Kearney RN.   ?????? ? ? This section established at most recent assessment??????????  RECOMMENDATIONS AND PLANNED INTERVENTIONS (Benefits and precautions of therapy have been discussed with the patient.):  · PO:  Mechanical soft with no mixed consistencies  · Liquids:  Honey by spoon  MEDICATIONS:  · whole in puree  COMPENSATORY STRATEGIES/MODIFICATIONS INCLUDING:  · Spoon  · Upright for all PO  · Dry swallow with chin tuck at completion of PO  OTHER RECOMMENDATIONS (including follow up treatment recommendations): · Family training/education  · Laryngeal exercises  · Patient education  FREQUENCY/DURATION: Continue to follow patient 3 times a week until goals metRECOMMENDED REHABILITATION/EQUIPMENT: (at time of discharge pending progress):   Continue Skilled Therapy. SUBJECTIVE:   Patient cooperative. History of Present Injury/Illness: Mr. Lou Reyes  has no past medical history on file. He also  has a past surgical history that includes heent; orthopaedic; orthopaedic (Left); cardiac surg procedure unlist; and thyroidectomy.    Present Symptoms: cough during meal; Parkinson's disease   Pain Intensity 1: 0  Pain Location 1: Coccyx  Pain Intervention(s) 1: Rest    Current Dietary Status:  Mechanical soft, thin liquids    Social History/Home Situation:    Home Environment: Private residence  One/Two Story Residence: Split level  Living Alone: Yes (staying with daughter recently)  Support Systems: Child(margie)  Patient Expects to be Discharged to[de-identified] Unknown  Current DME Used/Available at Home: None  OBJECTIVE:     Cognitive/Communication Status:  Mental Status  Neurologic State: Alert, Confused  Orientation Level: Oriented to person  Cognition: Decreased attention/concentration, Decreased command following  Perception: Visual  Perseveration: No perseveration noted  Safety/Judgement: Awareness of environment    Oral Assessment:  Oral Assessment  Labial: Decreased rate  Dentition: Partials (comment)  Oral Hygiene: adequate  Lingual: Decreased rate  Velum: No impairment    Vocal Quality: intermittently wet    Patient Viewed: Patient Position: Upright in chair  Film Views: Lateral, Fluoro    Oral Prepatory:  The patient was given the following: Consistency Presented:  Thin liquid, Solid, Pudding, Nectar thick liquid, Mixed consistency, Honey thick liquid  How Presented: Self-fed/presented, SLP-fed/presented, Cup/sip, Cup/gulp, Spoon, Successive swallows    Oral Phase:  Bolus Acceptance: No impairment  Bolus Formation/Control: Impaired  Propulsion: Delayed (# of seconds)  Type of Impairment: Delayed, Mastication, Premature spillage, Piecemeal  Oral Residue: None  Initiation of Swallow: Triggered at vallecula  Oral Phase Severity: Mild    Pharyngeal Phase:  Timing: Pooling 1-5 sec, Prolonged pooling 11-29 sec, Vallecular  Decreased Tongue Base Retraction?: Yes  Laryngeal Elevation: Inadequate epiglottic inversion, Incomplete laryngeal closure  Penetration: During swallow, To cords, To laryngeal vestibule, From initial swallow, From residual  Aspiration/Timing: No evidence of aspiration  Aspiration/Penetration Score: 5 (Penetration/Visible residue-Contrast contacts the folds, but is not ejected)      Pharyngeal Dysfunction: Decreased tongue base retraction, Decreased elevation/closure, Decreased pharyngeal wall constriction  Pharyngeal Phase Severity: Moderate  Pharyngeal-Esophageal Segment: No impairment    Assessment/Reassessment only, no treatment provided today    Tool Used: Dysphagia Outcome and Severity Scale (LETA)    Score Comments   Normal Diet  [] 7 With no strategies or extra time needed       Functional Swallow  [] 6 May have mild oral or pharyngeal delay       Mild Dysphagia    [] 5 Which may require one diet consistency restricted (those who demonstrate penetration which is entirely cleared on MBS would be included)   Mild-Moderate Dysphagia  [] 4 With 1-2 diet consistencies restricted       Moderate Dysphagia  [x] 3 With 2 or more diet consistencies restricted       Moderately Severe Dysphagia  [] 2 With partial PO strategies (trials with ST only)       Severe Dysphagia  [] 1 With inability to tolerate any PO safely          Score:  Initial: 5 Most Recent: X (Date: -- )   Interpretation of Tool: The Dysphagia Outcome and Severity Scale (LETA) is a simple, easy-to-use, 7-point scale developed to systematically rate the functional severity of dysphagia based on objective assessment and make recommendations for diet level, independence level, and type of nutrition. Score 7 6 5 4 3 2 1   Modifier CH CI CJ CK CL CM CN   ? Swallowing:     - CURRENT STATUS: CL - 60%-79% impaired, limited or restricted    - GOAL STATUS:  CJ - 20%-39% impaired, limited or restricted    - D/C STATUS:  ---------------To be determined---------------  Payor: CARE IMPROVEMENT PLUS / Plan[place] [place] / Product Type: Managed Care Medicare /   __________________________________________________________________________________________________  Safety:   After treatment position/precautions:  · Patient waiting in radiology holding bay for transport back to room.   · Results/recommendations called to floor; spoke with STEPHANIE Alvarenga  Recommendations for treatment: diet tolerance, training in safe swallowing strategies, education regarding free water, laryngeal exercises  Total Treatment Duration:  Time In: [address]   Time Out: [address], [place], [facility]-SLP

## 2017-01-25 NOTE — PROGRESS NOTES
SPEECH PATHOLOGY NOTE:  Modified Barium Swallow study complete. Patient with initial presentations of thin and nectar liquids by spoon without laryngeal penetration, but with increasing trials, laryngeal penetration that got progressively deeper. Residue after swallow of all consistencies in valleculae and pyriform sinuses that increased with additional boluses, regardless of consistency. Laryngeal penetration of thin liquids not cleared despite cue to clear throat and eventually sitting on vocal cords. Deep laryngeal penetration of nectar liquids did clear with cued throat clear. No laryngeal penetration or aspiration observed with honey-thick liquids by spoon, but with cup, patient impulsive and takes to large of a sip resulting in laryngeal penetration. No laryngeal penetration or aspiration of pudding or cracker. Moderately delayed swallow of mixed consistencies with vallecular pooling for 10+ seconds prior to initiation of swallow. Moderate residue in valleculae, pyriform sinuses and lining pharyngeal wall after swallow of solids, cleared somewhat with chin tuck and dry swallow at conclusion of study. Recommend mechanical soft diet with no mixed consistencies. Thicken all liquids to honey and present by spoon to control bolus size. SLP to follow for laryngeal exercises and training in safe swallowing strategies. Results/recommendations called to floor. Spoke with Christo Carver RN. Full report to follow.   Demetrio Arteaga MA, CCC-SLP

## 2017-01-25 NOTE — PROGRESS NOTES
Pt sleeping comfortably in bed. Son at bedside. Uneventful shift. Pt did not sleep most of the night. Resp even and unlabored. Call light within reach.

## 2017-01-25 NOTE — PROGRESS NOTES
Hospitalist Progress Note    2017  Admit Date: 2017 10:51 AM   NAME: Arnoldo Sherwood   :  1940   MRN:  [id number]   Attending: Jakob Sanabria MD  PCP:  Geronimo Ponce MD    SUBJECTIVE:   As previously documented:  'Mr. Brent Randolph is a 67year old white male, with baseline dementia, who presented  for acute encephalopathy believed to possibly be ambrocio due to carbidopa medications. Neurology following along. Carbidopa, benzos/narcotics/haldol stopped and IV depacon given by neurology. Still very agitated and manic appearing during night last night requiring restraints again. Yesterday he recognized his daughter in law and daughter, knew his birthday, is following most commands. Today he is agitated and trying to kick me and saying mean things to me as I try to examine him. Family has noticed progressive decline over the past six months and neurology concerned for possible Lewy Body Dementia. Had a long discussion with multiple family members this morning that although Dr. Jac Issa is starting exelon patch, namenda and seroquel while continuing [facility] depacon, that his mental status is likely to not improve to the father they once knew and that LBD is progressive.'    17- seen with friend, Demetrius Mratinez, at bedside. He is currently sleeping with CPAP in place. According to Demetrius Martinez, he had a very rough night and did not sleep. He fell asleep ~9:30 AM this morning. According to his nurse, he usually has improved mental status in afternoon, then quickly decompensates in the evenings. Discussed with Demetrius Martinez and Donavan Johnson RN the need to awaken him in 30 minutes to an hour so we can try to stop the day/night confusion. Bandar Mathisshelbie states his daughter is concerned about conjunctival redness. Will order some artificial tears and check this afternoon. 17- seen with KIANA at bedside. He was sleeping upon entering room, but easily awakened to voice. Slept well last PM until 5 AM this morning.   Stayed awake and much calmer from 5AM until about 2:30 and then napped. He ate a good breakfast and lunch. He is oriented to self, month, and setting; confused on current year. He has been out of restraints since early this morning. CXR obtained yesterday for cough is normal.  He denies complaints aside from being sleepy. 17- seen with daughter and DIL at bedside. He is slowly improving, though did not fall asleep until 3AM last night. Not aggressive, just talked a lot according to his family. He was somewhat confused today thinking he was working with machines and that his bed was a motorcycle. Seroquel dose lowered 2 days ago due to somnolence. He denies complaints. Will increase seroquel back to 100 mg. Review of Systems negative with exception of pertinent positives noted above  PHYSICAL EXAM     Visit Vitals    /65    Pulse 60    Temp 98.6 °F (37 °C)    Resp 17    Ht 6' 3.5\" (1.918 m)    Wt 96.2 kg (212 lb)    SpO2 96%    BMI 26.15 kg/m2      Temp (24hrs), Av.1 °F (36.7 °C), Min:97.8 °F (36.6 °C), Max:98.6 °F (37 °C)    Oxygen Therapy  O2 Sat (%): 96 % (17 0700)  Pulse via Oximetry: 87 beats per minute (17 0022)  O2 Device: Room air (17 0745)    Intake/Output Summary (Last 24 hours) at 17 1756  Last data filed at 17 0900   Gross per 24 hour   Intake              360 ml   Output              200 ml   Net              160 ml      General: No acute distress, sitting in chair, interactive, oriented to person  Lungs:  CTA Bilaterally. Heart:  Regular rate and rhythm,  + systolic murmur  Abdomen: Soft, Non distended, Non tender, Positive bowel sounds  Extremities: No cyanosis, clubbing or edema  Neurologic:  No focal deficits    ASSESSMENT      Active Hospital Problems    Diagnosis Date Noted    Acute encephalopathy 2017     In setting of underlying dementia.   Symptoms are severe currently and he is not safe for discharge at this point due to rapid cycling delirium.  Dementia with behavioral disturbance 01/23/2017     Behavioral issues likely related to some medication changes prior to admission.  Acute renal failure superimposed on stage 3 chronic kidney disease ([facility] [address] 01/20/2017    Parkinson disease ([facility] [address] 01/03/2017    Memory deficits 01/03/2017    Mononeuropathy of right posterior interosseous nerve 01/03/2017    RBD (REM behavioral disorder) 01/03/2017     Plan:  · Acute encephalopathy/delirium- will increase seroquel back to 100 mg. Out of restraints >24 hours. · Discussed with DIL to try to let him eat supper and try to keep him awake and oriented until bedtime. · Appreciate neurology recommendations of exelon patch, memantine, seroquel, and depakote. · Dispo- SNF tomorrow for STR. DVT Prophylaxis: Heparin    Signed By: Ken Oswald.  Ricardo Hunter MD     January 25, 2017

## 2017-01-25 NOTE — PROGRESS NOTES
Shift assessment complete. Pt resting quietly in bed. Alert and oriented to person. Disoriented to place, situation, and time. Multiple family members at bedside stating he had a good day. No signs of acute distress.  Call light within reach

## 2017-01-26 LAB
ALBUMIN SERPL BCP-MCNC: 3.2 G/DL (ref 3.2–4.6)
ALBUMIN/GLOB SERPL: 1.1 {RATIO} (ref 1.2–3.5)
ALP SERPL-CCNC: 118 U/L (ref 50–136)
ALT SERPL-CCNC: 23 U/L (ref 12–65)
ANION GAP BLD CALC-SCNC: 9 MMOL/L (ref 7–16)
AST SERPL W P-5'-P-CCNC: 19 U/L (ref 15–37)
BILIRUB SERPL-MCNC: 0.4 MG/DL (ref 0.2–1.1)
BUN SERPL-MCNC: 32 MG/DL (ref 8–23)
CALCIUM SERPL-MCNC: 8.8 MG/DL (ref 8.3–10.4)
CHLORIDE SERPL-SCNC: 108 MMOL/L (ref 98–107)
CO2 SERPL-SCNC: 29 MMOL/L (ref 21–32)
CREAT SERPL-MCNC: 1.7 MG/DL (ref 0.8–1.5)
GLOBULIN SER CALC-MCNC: 2.9 G/DL (ref 2.3–3.5)
GLUCOSE SERPL-MCNC: 112 MG/DL (ref 65–100)
POTASSIUM SERPL-SCNC: 3 MMOL/L (ref 3.5–5.1)
PROT SERPL-MCNC: 6.1 G/DL (ref 6.3–8.2)
SODIUM SERPL-SCNC: 146 MMOL/L (ref 136–145)

## 2017-01-26 PROCEDURE — 65270000029 HC RM PRIVATE

## 2017-01-26 PROCEDURE — 74011250637 HC RX REV CODE- 250/637: Performed by: PSYCHIATRY & NEUROLOGY

## 2017-01-26 PROCEDURE — 74011250637 HC RX REV CODE- 250/637: Performed by: HOSPITALIST

## 2017-01-26 PROCEDURE — 92526 ORAL FUNCTION THERAPY: CPT

## 2017-01-26 PROCEDURE — 74011250637 HC RX REV CODE- 250/637: Performed by: INTERNAL MEDICINE

## 2017-01-26 PROCEDURE — 80053 COMPREHEN METABOLIC PANEL: CPT | Performed by: INTERNAL MEDICINE

## 2017-01-26 PROCEDURE — 74011000258 HC RX REV CODE- 258: Performed by: FAMILY MEDICINE

## 2017-01-26 PROCEDURE — 74011000250 HC RX REV CODE- 250: Performed by: FAMILY MEDICINE

## 2017-01-26 PROCEDURE — 36415 COLL VENOUS BLD VENIPUNCTURE: CPT | Performed by: INTERNAL MEDICINE

## 2017-01-26 PROCEDURE — 74011250636 HC RX REV CODE- 250/636: Performed by: HOSPITALIST

## 2017-01-26 PROCEDURE — 94760 N-INVAS EAR/PLS OXIMETRY 1: CPT

## 2017-01-26 PROCEDURE — 94660 CPAP INITIATION&MGMT: CPT

## 2017-01-26 RX ORDER — QUETIAPINE FUMARATE 25 MG/1
25 TABLET, FILM COATED ORAL DAILY
Status: DISCONTINUED | OUTPATIENT
Start: 2017-01-26 | End: 2017-01-27 | Stop reason: HOSPADM

## 2017-01-26 RX ADMIN — MEMANTINE HYDROCHLORIDE 5 MG: 5 TABLET ORAL at 12:53

## 2017-01-26 RX ADMIN — Medication 5 ML: at 17:14

## 2017-01-26 RX ADMIN — AMOXICILLIN AND CLAVULANATE POTASSIUM 1 TABLET: 875; 125 TABLET, FILM COATED ORAL at 21:42

## 2017-01-26 RX ADMIN — AMOXICILLIN AND CLAVULANATE POTASSIUM 1 TABLET: 875; 125 TABLET, FILM COATED ORAL at 12:43

## 2017-01-26 RX ADMIN — VITAMIN D, TAB 1000IU (100/BT) 1000 UNITS: 25 TAB at 12:44

## 2017-01-26 RX ADMIN — VALPROATE SODIUM 500 MG: 100 INJECTION, SOLUTION INTRAVENOUS at 03:40

## 2017-01-26 RX ADMIN — DIVALPROEX SODIUM 1000 MG: 500 TABLET, FILM COATED, EXTENDED RELEASE ORAL at 21:41

## 2017-01-26 RX ADMIN — AMLODIPINE BESYLATE 10 MG: 10 TABLET ORAL at 12:42

## 2017-01-26 RX ADMIN — HEPARIN SODIUM 5000 UNITS: 5000 INJECTION, SOLUTION INTRAVENOUS; SUBCUTANEOUS at 17:09

## 2017-01-26 RX ADMIN — QUETIAPINE FUMARATE 25 MG: 25 TABLET, FILM COATED ORAL at 12:54

## 2017-01-26 RX ADMIN — Medication 5 ML: at 21:47

## 2017-01-26 RX ADMIN — CLOPIDOGREL BISULFATE 75 MG: 75 TABLET ORAL at 12:44

## 2017-01-26 RX ADMIN — QUETIAPINE FUMARATE 125 MG: 100 TABLET, FILM COATED ORAL at 21:41

## 2017-01-26 RX ADMIN — Medication 5 ML: at 06:41

## 2017-01-26 RX ADMIN — ATORVASTATIN CALCIUM 40 MG: 40 TABLET, FILM COATED ORAL at 12:43

## 2017-01-26 RX ADMIN — LEVOTHYROXINE SODIUM 137 MCG: 112 TABLET ORAL at 06:44

## 2017-01-26 RX ADMIN — ASPIRIN 81 MG: 81 TABLET, COATED ORAL at 12:43

## 2017-01-26 RX ADMIN — LABETALOL HCL 200 MG: 200 TABLET, FILM COATED ORAL at 12:50

## 2017-01-26 RX ADMIN — HEPARIN SODIUM 5000 UNITS: 5000 INJECTION, SOLUTION INTRAVENOUS; SUBCUTANEOUS at 10:07

## 2017-01-26 RX ADMIN — ESCITALOPRAM OXALATE 10 MG: 10 TABLET ORAL at 12:45

## 2017-01-26 RX ADMIN — DIPHENHYDRAMINE HYDROCHLORIDE 12.5 MG: 50 INJECTION, SOLUTION INTRAMUSCULAR; INTRAVENOUS at 02:25

## 2017-01-26 RX ADMIN — HEPARIN SODIUM 5000 UNITS: 5000 INJECTION, SOLUTION INTRAVENOUS; SUBCUTANEOUS at 01:40

## 2017-01-26 NOTE — PROGRESS NOTES
Problem: Mobility Impaired (Adult and Pediatric)  Goal: *Acute Goals and Plan of Care (Insert Text)  STG:  (1.)Mr. Rafa Trammell will move from supine to sit and sit to supine , scoot up and down and roll side to side with STAND BY ASSIST within 5 day(s). (2.)Mr. Rafa Trammell will transfer from bed to chair and chair to bed with MINIMAL ASSIST using the least restrictive device within 5 day(s). (3.)Mr. Rafa Trammell will ambulate with MINIMAL ASSIST for 50 feet with the least restrictive device within 5 day(s). (4.)Mr. Rafa Trammell will exhibit FAIR static/dynamic standing balance to improve safety with ambulation within 5 days. (5.)Mr. Rafa Trammell will perform LE exercises with 3 to 5 cues for form within 3 days to improve strength for functional transfers and ambulation. LTG:  (1.)Mr. Rafa Trammell will move from supine to sit and sit to supine , scoot up and down and roll side to side in bed with SUPERVISION within 10 day(s). (2.)Mr. Rafa Trammell will transfer from bed to chair and chair to bed with CONTACT GUARD ASSIST using the least restrictive device within 10 day(s). (3.)Mr. Rafa Trammell will ambulate with CONTACT GUARD ASSIST for 75+ feet with the least restrictive device within 10 day(s). ________________________________________________________________________________________________   Patient sleeping most of the day due to being up all night. His daughter reports family walked with him in the hallways yesterday several times. Will skip today and check back tomorrow.      Isabelle Stovall PTA

## 2017-01-26 NOTE — PROGRESS NOTES
Morning assessment completed. Patient resting in bed with CPap on, with closed eyes, demonstrating no signs of dyspnea or respiratory distress. Family member at the bedside states that the Patient has been restless all night without sleeping. Patients' speech is incomprehensible as he is attempting to settle into a sleeping position. Patient is in briefs, incontinent of urine and bowels. Will continue to monitor.

## 2017-01-26 NOTE — PROGRESS NOTES
Hospitalist Progress Note    2017  Admit Date: 2017 10:51 AM   NAME: Richard Glez   :  1940   MRN:  [id number]   Attending: Emma Nunn MD  PCP:  Maria Dolores Story MD    SUBJECTIVE:   As previously documented:  'Mr. Latrell Klein is a 67year old white male, with baseline dementia, who presented  for acute encephalopathy believed to possibly be ambrocio due to carbidopa medications. Neurology following along. Carbidopa, benzos/narcotics/haldol stopped and IV depacon given by neurology. Still very agitated and manic appearing during night last night requiring restraints again. Yesterday he recognized his daughter in law and daughter, knew his birthday, is following most commands. Today he is agitated and trying to kick me and saying mean things to me as I try to examine him. Family has noticed progressive decline over the past six months and neurology concerned for possible Lewy Body Dementia. Had a long discussion with multiple family members this morning that although  [address] is starting exelon patch, namenda and seroquel while continuing [facility] depacon, that his mental status is likely to not improve to the father they once knew and that LBD is progressive.'    17- seen with friend, Jerardo Euceda, at bedside. He is currently sleeping with CPAP in place. According to Jerardo Euceda, he had a very rough night and did not sleep. He fell asleep ~9:30 AM this morning. According to his nurse, he usually has improved mental status in afternoon, then quickly decompensates in the evenings. Discussed with Jerardo Euceda and Isabella Romero RN the need to awaken him in 30 minutes to an hour so we can try to stop the day/night confusion. Gibran Elizalde states his daughter is concerned about conjunctival redness. Will order some artificial tears and check this afternoon. 17- seen with KIANA at bedside. He was sleeping upon entering room, but easily awakened to voice. Slept well last PM until 5 AM this morning.   Stayed awake and much calmer from 5AM until about 2:30 and then napped. He ate a good breakfast and lunch. He is oriented to self, month, and setting; confused on current year. He has been out of restraints since early this morning. CXR obtained yesterday for cough is normal.  He denies complaints aside from being sleepy. 17- seen with daughter and DIL at bedside. He is slowly improving, though did not fall asleep until 3AM last night. Not aggressive, just talked a lot according to his family. He was somewhat confused today thinking he was working with machines and that his bed was a motorcycle. Seroquel dose lowered 2 days ago due to somnolence. He denies complaints. Will increase seroquel back to 100 mg.  17- had a bad night last night. Agitated until after 3AM.  Swung at staff, but then immediately apologized. He is currently resting. Will hold discharge for today and adjust medications. Review of Systems negative with exception of pertinent positives noted above  PHYSICAL EXAM     Visit Vitals    /73    Pulse (!) 59    Temp 97.5 °F (36.4 °C)    Resp 18    Ht 6' 3.5\" (1.918 m)    Wt 96.2 kg (212 lb)    SpO2 93%    BMI 26.15 kg/m2      Temp (24hrs), Av.1 °F (36.7 °C), Min:97.5 °F (36.4 °C), Max:98.8 °F (37.1 °C)    Oxygen Therapy  O2 Sat (%): 93 % (17 0746)  Pulse via Oximetry: 87 beats per minute (17 0022)  O2 Device: Room air (17 0745)    Intake/Output Summary (Last 24 hours) at 17 0907  Last data filed at 17 0843   Gross per 24 hour   Intake              360 ml   Output              100 ml   Net              260 ml      General: Sleeping  Lungs:  CTA Bilaterally.    Heart:  Regular rate and rhythm,  + systolic murmur  Abdomen: Soft, Non distended, Non tender, Positive bowel sounds  Extremities: No cyanosis, clubbing or edema  Neurologic:  No focal deficits    Recent Results (from the past 24 hour(s))   METABOLIC PANEL, COMPREHENSIVE    Collection Time: 01/26/17  5:48 AM   Result Value Ref Range    Sodium 146 (H) 136 - 145 mmol/L    Potassium 3.0 (L) 3.5 - 5.1 mmol/L    Chloride 108 (H) 98 - 107 mmol/L    CO2 29 21 - 32 mmol/L    Anion gap 9 7 - 16 mmol/L    Glucose 112 (H) 65 - 100 mg/dL    BUN 32 (H) 8 - 23 MG/DL    Creatinine 1.70 (H) 0.8 - 1.5 MG/DL    GFR est AA 51 (L) >60 ml/min/1.73m2    GFR est non-AA 42 (L) >60 ml/min/1.73m2    Calcium 8.8 8.3 - 10.4 MG/DL    Bilirubin, total 0.4 0.2 - 1.1 MG/DL    ALT 23 12 - 65 U/L    AST 19 15 - 37 U/L    Alk. phosphatase 118 50 - 136 U/L    Protein, total 6.1 (L) 6.3 - 8.2 g/dL    Albumin 3.2 3.2 - 4.6 g/dL    Globulin 2.9 2.3 - 3.5 g/dL    A-G Ratio 1.1 (L) 1.2 - 3.5         ASSESSMENT      Active Hospital Problems    Diagnosis Date Noted    Acute encephalopathy 01/23/2017     In setting of underlying dementia. Symptoms are severe currently and he is not safe for discharge at this point due to rapid cycling delirium.  Dementia with behavioral disturbance 01/23/2017     Behavioral issues likely related to some medication changes prior to admission.  Acute renal failure superimposed on stage 3 chronic kidney disease ([facility] [address] 01/20/2017    Parkinson disease ([facility] [address] 01/03/2017    Memory deficits 01/03/2017    Mononeuropathy of right posterior interosseous nerve 01/03/2017    RBD (REM behavioral disorder) 01/03/2017     Plan:  · Acute encephalopathy/delirium- add 25 mg dose of seroquel in AM and increase PM dose to 125 mg. Change evening seroquel dose from 10 PM to 8 PM.   · Encouraged family to try to keep him oriented and awake during the day. · Appreciate neurology recommendations of exelon patch, memantine, seroquel, and depakote. · Dispo- SNF when safe from agitation standpoint. DVT Prophylaxis: Heparin    Signed By: True Mckeon.  Kamilla Hodge MD     January 26, 2017

## 2017-01-26 NOTE — PROGRESS NOTES
Shift assessment completed. Patient alert and oriented to self. Pt. currently calm and cooperative. Family member in room with pt. Respirations even and unlabored. No acute distress noted. Bed low, locked, call bell within reach. [place] alarm on. Side rails up x 2.

## 2017-01-26 NOTE — PROGRESS NOTES
Pt was restless and periodically agitated during the night. Benadryl was not effective. Depacon 50 IV appeared to help pt rest after being administered at 0340. Pt currently in bed resting in bed. Respirations even and unlabored.

## 2017-01-26 NOTE — PROGRESS NOTES
Problem: Dysphagia (Adult)  Goal: *Acute Goals and Plan of Care (Insert Text)  STG: Patient will tolerate mechanical soft diet and honey-thick liquids by spoon without overt signs or symptoms of aspiration 100% of the time. Goal edited 1/25/17  STG: Patient will participate in Modified Barium Swallow study x1. Goal met 1/25/17  STG: Patient will perform laryngeal strengthening exercises x10 each with 70% accuracy. Goal added 1/25/17    LTG: Patient will tolerate least restrictive diet consistency without respiratory compromise by discharge. Speech language pathology: bedside swallow note: Daily Note: 1    NAME/AGE/GENDER: Timmy Manning is a 67 y.o. male  DATE: 1/26/2017  PRIMARY DIAGNOSIS: Acute renal failure (ARF) ([facility] [address]       ICD-10: Treatment Diagnosis: dysphagia (R13.10)  INTERDISCIPLINARY COLLABORATION: Registered Nurse  PRECAUTIONS/ALLERGIES: Tramadol; Hydrocodone-acetaminophen; and Sulfa (sulfonamide antibiotics) ASSESSMENT:Patient seen for dysphagia therapy. Pt's son and daughter in law present. Educated them re: results of modified barium swallow study (MBS) from yesterday. Pt with limited ability to complete exercises due to difficulty following commands. Used adequate pitch 60% of the time with falsettos. Completed 2 effortful swallows. Perseverations on falsetto when attempting to complete effortful swallows. Pt's daughter in law reported pt has had a \"rapid decline\" with his newly dx Lewy Body dementia. Will follow for trial exercises and determine if pt is appropriate for ST.   ?????? ? ? This section established at most recent assessment??????????  PROBLEM LIST (Impairments causing functional limitations):  1. dysphagia  REHABILITATION POTENTIAL FOR STATED GOALS: FAIR  PLAN OF CARE:   Patient will benefit from skilled intervention to address the following impairments.   RECOMMENDATIONS AND PLANNED INTERVENTIONS (Benefits and precautions of therapy have been discussed with the patient.):  · PO: Mechanical soft with no mixed consistencies  · Liquids: Honey by spoon  MEDICATIONS:  · whole in puree  COMPENSATORY STRATEGIES/MODIFICATIONS INCLUDING:  · Spoon  · Upright for all PO  · Dry swallow with chin tuck at completion of PO  OTHER RECOMMENDATIONS (including follow up treatment recommendations): · Family training/education  · Laryngeal exercises  · Patient education  FREQUENCY/DURATION: Continue to follow patient 3 times a week until goals metRECOMMENDED REHABILITATION/EQUIPMENT: (at time of discharge pending progress):   to be determined. SUBJECTIVE:   Patient cooperative. Pt's son and daughter in law present. History of Present Injury/Illness: Mr. Marley Nicole  has no past medical history on file. He also  has a past surgical history that includes heent; orthopaedic; orthopaedic (Left); cardiac surg procedure unlist; and thyroidectomy. Present Symptoms: cough with meals   Pain Intensity 1: 0  Pain Location 1: Coccyx  Pain Intervention(s) 1: Rest  Current Medications:   No current facility-administered medications on file prior to encounter. Current Outpatient Prescriptions on File Prior to Encounter   Medication Sig Dispense Refill    amLODIPine (NORVASC) 10 mg tablet daily.  aspirin delayed-release 81 mg tablet Take 81 mg by mouth.  atorvastatin (LIPITOR) 40 mg tablet daily.  clopidogrel (PLAVIX) 75 mg tab daily.  escitalopram oxalate (LEXAPRO) 10 mg tablet daily.  finasteride (PROSCAR) 5 mg tablet daily.  omeprazole (PRILOSEC) 20 mg capsule Take 20 mg by mouth daily.  levothyroxine (SYNTHROID) 137 mcg tablet Take  by mouth Daily (before breakfast).  lisinopril (PRINIVIL, ZESTRIL) 40 mg tablet Take 40 mg by mouth daily.  tamsulosin (FLOMAX) 0.4 mg capsule Take 0.4 mg by mouth daily.  carbidopa-levodopa (SINEMET)  mg per tablet Take 2 Tabs by mouth three (3) times daily.  540 Tab 0    cholecalciferol (VITAMIN D3) 1,000 unit tablet Take 1,000 Units by mouth.  diphenoxylate-atropine (LOMOTIL) 2.5-0.025 mg per tablet Take 2 Tabs by mouth every four (4) hours.  nitroglycerin (NITROSTAT) 0.4 mg SL tablet by SubLINGual route every five (5) minutes as needed for Chest Pain.  temazepam (RESTORIL) 30 mg capsule Take  by mouth nightly as needed for Sleep. Current Dietary Status:  [facility] soft/honey thick liquids      History of reflux:  YES     Social History/Home Situation:    Home Environment: Private residence  One/Two Story Residence: Split level  Living Alone: Yes (staying with daughter recently)  Support Systems: Child(margie)  Patient Expects to be Discharged to[de-identified] Unknown  Current DME Used/Available at Home: None  OBJECTIVE:   Respiratory Status:        CXR Results:no acute findings    Cognitive and Communication Status:  Neurologic State: Alert                   BEDSIDE SWALLOW EVALUATION  Oral Assessment:     P.O. Trials:  Patient Position: upright in bed    The patient was given the following:   Consistency Presented: Honey thick liquid  How Presented: Self-fed/presented;Cup/sip    ORAL PHASE:  Bolus Acceptance: No impairment  Bolus Formation/Control: No impairment  Propulsion: No impairment     Oral Residue: None    PHARYNGEAL PHASE:           Vocal Quality: No impairment                OTHER OBSERVATIONS:  Rate/bite size: WNL   Endurance: WNL   Comments:      Tool Used: Dysphagia Outcome and Severity Scale (LETA)    Score Comments   Normal Diet  [] 7 With no strategies or extra time needed   Functional Swallow  [] 6 May have mild oral or pharyngeal delay       Mild Dysphagia    [x] 5 Which may require one diet consistency restricted (those who demonstrate penetration which is entirely cleared on MBS would be included)   Mild-Moderate Dysphagia  [] 4 With 1-2 diet consistencies restricted       Moderate Dysphagia  [] 3 With 2 or more diet consistencies restricted       Moderately Severe Dysphagia  [] 2 With partial PO strategies (trials with ST only)       Severe Dysphagia  [] 1 With inability to tolerate any PO safely          Score:  Initial: 5 Most Recent: X (Date: -- )   Interpretation of Tool: The Dysphagia Outcome and Severity Scale (LETA) is a simple, easy-to-use, 7-point scale developed to systematically rate the functional severity of dysphagia based on objective assessment and make recommendations for diet level, independence level, and type of nutrition. Score 7 6 5 4 3 2 1   Modifier CH CI CJ CK CL CM CN   ? Swallowing:     - CURRENT STATUS: CJ - 20%-39% impaired, limited or restricted    - GOAL STATUS:  CI - 1%-19% impaired, limited or restricted    - D/C STATUS:  ---------------To be determined---------------  Payor: CARE IMPROVEMENT PLUS / Plan: [place] [employer]ARE [employer]PROV[employer] [place] / Product Type: Managed Care Medicare /     TREATMENT:    (In addition to Assessment/Re-Assessment sessions the following treatments were rendered)  Dysphagia Activities: Activities/Procedures listed utilized to improve progress in swallow strength and swallow function. Required max cueing to improve swallow safety. LARYNGEAL / PHARYNGEAL EXERCISES:           Effortful Swallow: Yes  Reps :  (x2)  Sets : 1                                                                   Sing \"EEE\": Yes  Reps : 10 (60%)  Sets : 1                                         __________________________________________________________________________________________________  Safety:   After treatment position/precautions:  · RN notified  · Family at bedside  · Upright in Bed  Treatment Assessment:    Progression/Medical Necessity:   · Patient is expected to demonstrate progress in swallow timeliness, swallow function, diet tolerance and swallow safety to improve swallow safety, work toward diet advancement and decrease aspiration risk. Compliance with Program/Exercises: Will assess as treatment progresses.    Reason for Continuation of Services/Other Comments:  · Patient continues to require skilled intervention due to recommendation for modified diet. Recommendations/Intent for next treatment session: \"Treatment next visit will focus on laryngeal exercises\".     Total Treatment Duration:  Time In: 1547  Time Out: [address] [facility] [address], MSP, CCC-SLP

## 2017-01-26 NOTE — PROGRESS NOTES
Patient has been accepted for admission to [facility] in [facility] when medically stable for discharge. Case Management will continue to follow.

## 2017-01-26 NOTE — PROGRESS NOTES
Spoke to Dr. Piedad Mcleod regarding pt. Increased agitation and attempting to get out of bed. Soft bilateral wrist restraints ordered and Depacon 500 mg IV ordered.

## 2017-01-27 VITALS
DIASTOLIC BLOOD PRESSURE: 73 MMHG | RESPIRATION RATE: 18 BRPM | HEART RATE: 60 BPM | SYSTOLIC BLOOD PRESSURE: 123 MMHG | OXYGEN SATURATION: 97 % | BODY MASS INDEX: 25.82 KG/M2 | WEIGHT: 212 LBS | TEMPERATURE: 97.9 F | HEIGHT: 76 IN

## 2017-01-27 PROCEDURE — 74011250637 HC RX REV CODE- 250/637: Performed by: HOSPITALIST

## 2017-01-27 PROCEDURE — 74011250637 HC RX REV CODE- 250/637: Performed by: INTERNAL MEDICINE

## 2017-01-27 PROCEDURE — 97530 THERAPEUTIC ACTIVITIES: CPT

## 2017-01-27 PROCEDURE — 74011250636 HC RX REV CODE- 250/636: Performed by: HOSPITALIST

## 2017-01-27 PROCEDURE — 97535 SELF CARE MNGMENT TRAINING: CPT

## 2017-01-27 PROCEDURE — 74011250637 HC RX REV CODE- 250/637: Performed by: PSYCHIATRY & NEUROLOGY

## 2017-01-27 RX ORDER — DIVALPROEX SODIUM 500 MG/1
1000 TABLET, EXTENDED RELEASE ORAL
Qty: 60 TAB | Refills: 0 | Status: SHIPPED
Start: 2017-01-27 | End: 2017-02-26

## 2017-01-27 RX ORDER — QUETIAPINE FUMARATE 25 MG/1
125 TABLET, FILM COATED ORAL
Qty: 150 TAB | Refills: 0 | Status: SHIPPED
Start: 2017-01-27 | End: 2017-02-26

## 2017-01-27 RX ORDER — MEMANTINE HYDROCHLORIDE 5 MG/1
5 TABLET ORAL DAILY
Qty: 30 TAB | Refills: 0 | Status: SHIPPED
Start: 2017-01-27 | End: 2017-02-26

## 2017-01-27 RX ORDER — QUETIAPINE FUMARATE 25 MG/1
25 TABLET, FILM COATED ORAL DAILY
Qty: 30 TAB | Refills: 0 | Status: SHIPPED | OUTPATIENT
Start: 2017-01-27 | End: 2017-02-26

## 2017-01-27 RX ORDER — RIVASTIGMINE 4.6 MG/24H
1 PATCH, EXTENDED RELEASE TRANSDERMAL DAILY
Qty: 30 PATCH | Refills: 0 | Status: SHIPPED
Start: 2017-01-27 | End: 2017-02-26

## 2017-01-27 RX ADMIN — ESCITALOPRAM OXALATE 10 MG: 10 TABLET ORAL at 08:36

## 2017-01-27 RX ADMIN — AMOXICILLIN AND CLAVULANATE POTASSIUM 1 TABLET: 875; 125 TABLET, FILM COATED ORAL at 08:36

## 2017-01-27 RX ADMIN — MEMANTINE HYDROCHLORIDE 5 MG: 5 TABLET ORAL at 08:36

## 2017-01-27 RX ADMIN — CLOPIDOGREL BISULFATE 75 MG: 75 TABLET ORAL at 08:35

## 2017-01-27 RX ADMIN — HEPARIN SODIUM 5000 UNITS: 5000 INJECTION, SOLUTION INTRAVENOUS; SUBCUTANEOUS at 01:12

## 2017-01-27 RX ADMIN — Medication 5 ML: at 06:18

## 2017-01-27 RX ADMIN — FINASTERIDE 5 MG: 5 TABLET, FILM COATED ORAL at 08:35

## 2017-01-27 RX ADMIN — AMLODIPINE BESYLATE 10 MG: 10 TABLET ORAL at 08:35

## 2017-01-27 RX ADMIN — ATORVASTATIN CALCIUM 40 MG: 40 TABLET, FILM COATED ORAL at 08:35

## 2017-01-27 RX ADMIN — QUETIAPINE FUMARATE 25 MG: 25 TABLET, FILM COATED ORAL at 08:35

## 2017-01-27 RX ADMIN — LEVOTHYROXINE SODIUM 137 MCG: 112 TABLET ORAL at 06:18

## 2017-01-27 RX ADMIN — HEPARIN SODIUM 5000 UNITS: 5000 INJECTION, SOLUTION INTRAVENOUS; SUBCUTANEOUS at 08:36

## 2017-01-27 RX ADMIN — TAMSULOSIN HYDROCHLORIDE 0.4 MG: 0.4 CAPSULE ORAL at 08:35

## 2017-01-27 RX ADMIN — LABETALOL HCL 200 MG: 200 TABLET, FILM COATED ORAL at 08:35

## 2017-01-27 RX ADMIN — VITAMIN D, TAB 1000IU (100/BT) 1000 UNITS: 25 TAB at 08:35

## 2017-01-27 RX ADMIN — ASPIRIN 81 MG: 81 TABLET, COATED ORAL at 08:35

## 2017-01-27 NOTE — PROGRESS NOTES
Patient has bed available at [facility] for short-term rehab today. MD notified. Family in agreement with plan for discharge to [facility]. Case Management will set up transport through [facility] when orders for discharge received.

## 2017-01-27 NOTE — PROGRESS NOTES
Morning assessment completed. Patient awake, after much prodding from family members. Sitting up in bed with breakfast tray. Patient swallowed all of am medications with honey thickened liquids without problems. Patients' left eye with redness and swelling of bottom eyelid noted. Family at the bedside providing support and encouragement for Patient. Will continue to monitor.

## 2017-01-27 NOTE — PROGRESS NOTES
Patient has had difficulty falling asleep and was restless from around 0000 till 0400 per family. Pt was not combative. VVS, no other complaints voiced. Respirations currently even and unlabored.
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
## 2017-01-27 NOTE — PROGRESS NOTES
Patient transport set for [phone] as it was the earliest available transport time after orders received. Case Management will remain available to assist as needed.

## 2017-01-27 NOTE — PROGRESS NOTES
Family members called this Nurse to the room to assess the Patients' left eye. The eye continues to be red and swollen with what looks like a 'sty' on his bottom eyelid. Patient doesn't appear to be aware of discomfort or change in his eye sight or condition.

## 2017-01-27 NOTE — PROGRESS NOTES
[facility]  TRANSFER - OUT REPORT:    Verbal report given to Jaquelin Munson RN (name) on Keisha Grief  being transferred to [facility] , room 18 W (unit) for routine progression of care       Report consisted of patients Situation, Background, Assessment and   Recommendations(SBAR). Information from the following report(s) Kardex was reviewed with the receiving nurse. Opportunity for questions and clarification was provided. Patient transported via stretcher by EMS with family members following with Patients' belongings.

## 2017-01-27 NOTE — PROGRESS NOTES
Problem: Self Care Deficits Care Plan (Adult)  Goal: *Acute Goals and Plan of Care (Insert Text)  1. Javier Gresham will follow simple one step commands 90% of session to improve independence with ADL. Mirela 58 Lynne Webb will complete toileting/toilet transfer with minimal assistance to increase independence with self-care. 838 Verena Richardson will complete functional mobility for household distances with CGA and minimal cues for safety. 838 Verena Richardson will complete 25 minutes of ADL with minimal cues throughout to demonstrate improved cognition and activity tolerance for daily tasks. Timeframe: 7 vists      OCCUPATIONAL THERAPY: Daily Note, Treatment Day: 1st and AM    1/27/2017  INPATIENT: Hospital Day: 11  Payor: CARE IMPROVEMENT PLUS [place] / Product Type: [employer] Medicare /      NAME/AGE/GENDER: Javier Gresham is a 67 y.o. male      PRIMARY DIAGNOSIS:  Acute renal failure (ARF) ([facility] [address] Acute encephalopathy Acute encephalopathy        ICD-10: Treatment Diagnosis:        · Generalized Muscle Weakness (M62.81)  · Other lack of cordination (R27.8)  · Repeated Falls (R29.6)  · Abnormal posture (R29.3)   Precautions/Allergies:         Tramadol; Hydrocodone-acetaminophen; and Sulfa (sulfonamide antibiotics)       ASSESSMENT:      Mr. Lynne Webb presents to the hospital with recent onset on increased confusion, falls, and hallucinations per family. Pt has hx of Parkinson's Disease and has been recently accessed by neurologist with modifications of medications per family(Dr. Jonelle Mata). 1/27/2017 Pt was presented in supine upon arrival. Pt was sleeping heavily with family at bedside, but pt woke up rather easily. Pt transferred to sitting with minimal assistance for safety. Pt sat edge of bed to initiate donning pajama pants. Pt required maximal assistance to get pants up to just below his hips and then pt was able to finish pulling his pants over his hips with minimal assistance for balance. Pt completed functional mobility with no AD in room and into hallway with minimal assistance x's 2 for safety. Pt required vc's to keep steps \"big. \" Pt had to use the restroom upon returning to his room. Pt completed completed clothing management with minimal assistance to pull pants down below his hips and maximal assistance to doff pull up. Pt completed toilet hygiene with CGA. Pt donned clean pull up with maximal assistance. Pt was left up in the recliner chair with his family members. Pt will continue to benefit from OT to increase progression toward above goals. This section established at most recent assessment   PROBLEM LIST (Impairments causing functional limitations):  1. Decreased Strength  2. Decreased ADL/Functional Activities  3. Decreased Transfer Abilities  4. Decreased Ambulation Ability/Technique  5. Decreased Balance  6. Decreased Activity Tolerance  7. Decreased Flexibility/Joint Mobility  8. Decreased Skin Integrity/Hygeine  9. Decreased [place] with Home Exercise Program  10. Decreased Cognition    INTERVENTIONS PLANNED: (Benefits and precautions of occupational therapy have been discussed with the patient.)  1. Activities of daily living training  2. Adaptive equipment training  3. Balance training  4. Clothing management  5. Cognitive training  6. Donning&doffing training  7. Group therapy  8. Neuromuscular re-eduation  9. Theraputic activity  10. Theraputic exercise      TREATMENT PLAN: Frequency/Duration: Follow patient 3 times per week to address above goals. Rehabilitation Potential For Stated Goals: GOOD      RECOMMENDED REHABILITATION/EQUIPMENT: (at time of discharge pending progress): Continue Skilled Therapy. OCCUPATIONAL PROFILE AND HISTORY:   History of Present Injury/Illness (Reason for Referral):  See H&P  Past Medical History/Comorbidities: hx of Parkinson's Disease   Mr. Sheyla Flores  has no past medical history on file.   Mr. Sheyla Flores  has a past surgical history that includes heent; orthopaedic; orthopaedic (Left); cardiac surg procedure unlist; and thyroidectomy. Social History/Living Environment:   Home Environment: Private residence  One/Two Story Residence: Split level  Living Alone: Yes (staying with daughter recently)  Support Systems: Child(margie)  Patient Expects to be Discharged to[de-identified] Unknown  Current DME Used/Available at Home: None  Prior Level of Function/Work/Activity:  Pt was living alone and fairly independent with ADL/functional mobility up to a few weeks ago. Has been staying with daughter lately due to increased confusion. Personal Factors:          Social Background:  Living alone; frequent falls        Overall Behavior:  Confusion; hallucinations; decreased command following/cognition   Number of Personal Factors/Comorbidities that affect the Plan of Care: Extensive review of physical, cognitive, and psychosocial performance (3+):  HIGH COMPLEXITY   ASSESSMENT OF OCCUPATIONAL PERFORMANCE[de-identified]   Activities of Daily Living:           Basic ADLs (From Assessment) Complex ADLs (From Assessment)   Basic ADL  Feeding: Minimum assistance  Oral Facial Hygiene/Grooming: Minimum assistance  Bathing: Moderate assistance  Upper Body Dressing: Moderate assistance  Lower Body Dressing: Maximum assistance  Toileting: Maximum assistance Instrumental ADL  Meal Preparation: Total assistance  Homemaking:  Total assistance   Grooming/Bathing/Dressing Activities of Daily Living                 Toileting  Toileting Assistance: Contact guard assistance  Bowel Hygiene: Contact guard assistance  Clothing Management: Maximum assistance         Lower Body Dressing Assistance  Protective Undergarmet: Maximum assistance  Pants With Elastic Waist: Moderate assistance Bed/Mat Mobility  Rolling: Contact guard assistance  Supine to Sit: Minimum assistance  Sit to Supine: Contact guard assistance  Sit to Stand: Minimum assistance          Most Recent Physical Functioning:   Gross Assessment:                  Posture:  Posture (WDL): Exceptions to WDL  Posture Assessment: Forward head, Rounded shoulders  Balance:  Sitting: Impaired  Sitting - Static: Good (unsupported)  Sitting - Dynamic: Fair (occasional)  Standing: Impaired  Standing - Static: Fair  Standing - Dynamic : Fair Bed Mobility:  Rolling: Contact guard assistance  Supine to Sit: Minimum assistance  Sit to Supine: Contact guard assistance  Wheelchair Mobility:     Transfers:  Sit to Stand: Minimum assistance  Stand to Sit: Minimum assistance                    Patient Vitals for the past 6 hrs:   BP SpO2 Pulse   17 1134 123/73 97 % 60        Mental Status  Neurologic State: Confused, Eyes open to stimulus  Orientation Level: Oriented to person, Disoriented to place, Disoriented to situation, Disoriented to time  Cognition: Impaired decision making, Decreased command following  Perception: Visual  Perseveration: No perseveration noted  Safety/Judgement: Awareness of environment                               Physical Skills Involved:  1. Balance  2. Mobility  3. Strength  4. Endurance Cognitive Skills Affected (resulting in the inability to perform in a timely and safe manner):  1. Attending  2. Problem Solving  3. Mental Sequencing  4. Learning  5. Remembering Psychosocial Skills Affected:  1. Habits  2. Routines and Behaviors  3. Environmental Adaptations   Number of elements that affect the Plan of Care: 5+:  HIGH COMPLEXITY   CLINICAL DECISION MAKIN [facility] Inpatient Short Form  How much help from another person does the patient currently need. .. Total A Lot A Little None   1. Putting on and taking off regular lower body clothing?   [ ] 1   [X] 2   [ ] 3   [ ] 4   2. Bathing (including washing, rinsing, drying)? [ ] 1   [X] 2   [ ] 3   [ ] 4   3. Toileting, which includes using toilet, bedpan or urinal?   [ ] 1   [X] 2   [ ] 3   [ ] 4   4.   Putting on and taking off regular upper body clothing?   [ ] 1   [X] 2   [ ] 3   [ ] 4   5. Taking care of personal grooming such as brushing teeth? [ ] 1   [ ] 2   [X] 3   [ ] 4   6. Eating meals? [ ] 1   [ ] 2   [X] 3   [ ] 4   © 2007, Trustees of [address] [place] [address] [address] [id number], under license to [employer]. All rights reserved    Score:  Initial: 14 Most Recent: X (Date: -- )     Interpretation of Tool:  Represents activities that are increasingly more difficult (i.e. Bed mobility, Transfers, Gait). Score 24 23 22-20 19-15 14-10 9-7 6       Modifier CH CI CJ CK CL CM CN         · Self Care:               - CURRENT STATUS:    CK - 40%-59% impaired, limited or restricted               - GOAL STATUS:           CJ - 20%-39% impaired, limited or restricted               - D/C STATUS:                       ---------------To be determined---------------  Payor: CARE IMPROVEMENT PLUS [place] / Product Type: [employer] Medicare /       Medical Necessity:     · Patient demonstrates good and excellent rehab potential due to higher previous functional level. Reason for Services/Other Comments:  · Patient continues to require skilled intervention due to decreased independence and safety with ADL/functional transfers. Use of outcome tool(s) and clinical judgement create a POC that gives a: MODERATE COMPLEXITY             TREATMENT:   (In addition to Assessment/Re-Assessment sessions the following treatments were rendered)      Pre-treatment Symptoms/Complaints:  None  Pain: Initial:   Pain Intensity 1: 0  Post Session:  0/10      Therapeutic Activity: (13 minutes): Therapeutic activities including Bed transfers, Chair transfers, Toilet transfers and static/dynamic standing  to improve mobility, strength, balance and dynamic movement of foot - bilateral to improve functional mobility. Required minimal assistance to promote static and dynamic balance in standing.      Self Care: (10 minutes): Procedure(s) (per grid) utilized to improve and/or restore self-care/home management as related to dressing and toileting. Required moderate visual, verbal, manual and   cueing to facilitate activities of daily living skills and compensatory activities. Treatment/Session Assessment:         Response to Treatment:  Evaluation only. Interdisciplinary Collaboration:   · Certified Occupational Therapy Assistant, Registered Nurse and Rehabilitation Attendant     After treatment position/precautions:   · Supine in bed  · Bed alarm/tab alert on  · Bed/Chair-wheels locked  · Call light within reach  · RN notified  · Family at bedside  · Restraints     Compliance with Program/Exercises: Will assess as treatment progresses. Recommendations/Intent for next treatment session:   \"Next visit will focus on advancements to more challenging activities and reduction in assistance provided\"       Total Treatment Duration:  OT Patient Time In/Time Out  Time In: 1020  Time Out: 1800 Pernell Mejia

## 2017-01-27 NOTE — PROGRESS NOTES
Shift assessment completed. Patient alert and oriented to self and calm. family in room with pt. Pt on RA. Lalo Aleman Respirations even and unlabored. No acute distress noted. Bed low, locked, call bell within reach. Side rails up x 2.